# Patient Record
Sex: FEMALE | Race: AMERICAN INDIAN OR ALASKA NATIVE | NOT HISPANIC OR LATINO | URBAN - METROPOLITAN AREA
[De-identification: names, ages, dates, MRNs, and addresses within clinical notes are randomized per-mention and may not be internally consistent; named-entity substitution may affect disease eponyms.]

---

## 2023-10-15 ENCOUNTER — INPATIENT (INPATIENT)
Facility: HOSPITAL | Age: 28
LOS: 5 days | Discharge: HOME HEALTH | End: 2023-10-21
Attending: SPECIALIST | Admitting: SPECIALIST
Payer: MEDICAID

## 2023-10-15 VITALS
DIASTOLIC BLOOD PRESSURE: 70 MMHG | HEART RATE: 90 BPM | SYSTOLIC BLOOD PRESSURE: 119 MMHG | TEMPERATURE: 98 F | RESPIRATION RATE: 16 BRPM

## 2023-10-15 DIAGNOSIS — O26.899 OTHER SPECIFIED PREGNANCY RELATED CONDITIONS, UNSPECIFIED TRIMESTER: ICD-10-CM

## 2023-10-15 DIAGNOSIS — O42.90 PREMATURE RUPTURE OF MEMBRANES, UNSPECIFIED AS TO LENGTH OF TIME BETWEEN RUPTURE AND ONSET OF LABOR, UNSPECIFIED WEEKS OF GESTATION: ICD-10-CM

## 2023-10-15 LAB
AMNISURE ROM (RUPTURE OF MEMBRANES): POSITIVE
BASOPHILS # BLD AUTO: 0.07 K/UL — SIGNIFICANT CHANGE UP (ref 0–0.2)
BASOPHILS NFR BLD AUTO: 0.6 % — SIGNIFICANT CHANGE UP (ref 0–2)
EOSINOPHIL # BLD AUTO: 0.2 K/UL — SIGNIFICANT CHANGE UP (ref 0–0.5)
EOSINOPHIL NFR BLD AUTO: 1.7 % — SIGNIFICANT CHANGE UP (ref 0–6)
HCT VFR BLD CALC: 34.3 % — LOW (ref 34.5–45)
HGB BLD-MCNC: 11.2 G/DL — LOW (ref 11.5–15.5)
IANC: 7.87 K/UL — HIGH (ref 1.8–7.4)
IMM GRANULOCYTES NFR BLD AUTO: 1.2 % — HIGH (ref 0–0.9)
LYMPHOCYTES # BLD AUTO: 19.6 % — SIGNIFICANT CHANGE UP (ref 13–44)
LYMPHOCYTES # BLD AUTO: 2.29 K/UL — SIGNIFICANT CHANGE UP (ref 1–3.3)
MCHC RBC-ENTMCNC: 30.2 PG — SIGNIFICANT CHANGE UP (ref 27–34)
MCHC RBC-ENTMCNC: 32.7 GM/DL — SIGNIFICANT CHANGE UP (ref 32–36)
MCV RBC AUTO: 92.5 FL — SIGNIFICANT CHANGE UP (ref 80–100)
MONOCYTES # BLD AUTO: 1.12 K/UL — HIGH (ref 0–0.9)
MONOCYTES NFR BLD AUTO: 9.6 % — SIGNIFICANT CHANGE UP (ref 2–14)
NEUTROPHILS # BLD AUTO: 7.87 K/UL — HIGH (ref 1.8–7.4)
NEUTROPHILS NFR BLD AUTO: 67.3 % — SIGNIFICANT CHANGE UP (ref 43–77)
NRBC # BLD: 0 /100 WBCS — SIGNIFICANT CHANGE UP (ref 0–0)
NRBC # FLD: 0 K/UL — SIGNIFICANT CHANGE UP (ref 0–0)
PLATELET # BLD AUTO: 317 K/UL — SIGNIFICANT CHANGE UP (ref 150–400)
RBC # BLD: 3.71 M/UL — LOW (ref 3.8–5.2)
RBC # FLD: 13.5 % — SIGNIFICANT CHANGE UP (ref 10.3–14.5)
WBC # BLD: 11.69 K/UL — HIGH (ref 3.8–10.5)
WBC # FLD AUTO: 11.69 K/UL — HIGH (ref 3.8–10.5)

## 2023-10-15 RX ORDER — CHLORHEXIDINE GLUCONATE 213 G/1000ML
1 SOLUTION TOPICAL DAILY
Refills: 0 | Status: DISCONTINUED | OUTPATIENT
Start: 2023-10-15 | End: 2023-10-16

## 2023-10-15 RX ORDER — SODIUM CHLORIDE 9 MG/ML
1000 INJECTION, SOLUTION INTRAVENOUS
Refills: 0 | Status: DISCONTINUED | OUTPATIENT
Start: 2023-10-15 | End: 2023-10-16

## 2023-10-15 RX ORDER — SODIUM CHLORIDE 9 MG/ML
1000 INJECTION, SOLUTION INTRAVENOUS ONCE
Refills: 0 | Status: COMPLETED | OUTPATIENT
Start: 2023-10-15 | End: 2023-10-15

## 2023-10-15 RX ORDER — SODIUM CHLORIDE 9 MG/ML
1000 INJECTION, SOLUTION INTRAVENOUS ONCE
Refills: 0 | Status: DISCONTINUED | OUTPATIENT
Start: 2023-10-15 | End: 2023-10-16

## 2023-10-15 RX ORDER — OXYTOCIN 10 UNIT/ML
333.33 VIAL (ML) INJECTION
Qty: 20 | Refills: 0 | Status: DISCONTINUED | OUTPATIENT
Start: 2023-10-15 | End: 2023-10-16

## 2023-10-15 RX ADMIN — SODIUM CHLORIDE 1000 MILLILITER(S): 9 INJECTION, SOLUTION INTRAVENOUS at 21:26

## 2023-10-15 NOTE — OB PROVIDER H&P - TERM DELIVERIES, OB PROFILE
Discharge instructions given to patient. Follow up information provided. Prescriptions called to pharmacy per provider, verbalized understanding.      Brayden Wagner RN  05/31/23 [phone]

## 2023-10-15 NOTE — OB PROVIDER H&P - NS_OBGYNHISTORY_OBGYN_ALL_OB_FT
Prenatal care in [facility] - Fetal Alert BABY has a mild to moderate Tricuspid regurgitation

## 2023-10-15 NOTE — OB PROVIDER H&P - NSLOWPPHRISK_OBGYN_A_OB
No previous uterine incision/[place] Pregnancy/Less than or equal to 4 previous vaginal births/No known bleeding disorder/No history of postpartum hemorrhage/No other PPH risks indicated

## 2023-10-15 NOTE — OB PROVIDER TRIAGE NOTE - NS_OBGYNHISTORY_OBGYN_ALL_OB_FT
Prenatal care in [facility] - Fetal Alert BABY has a mild to moderate Tricuspid regurgitation

## 2023-10-15 NOTE — OB PROVIDER TRIAGE NOTE - NSOBPROVIDERNOTE_OBGYN_ALL_OB_FT
29 y/o , EDC 10/22, GA 39 weeks,  -fetal tachycardia, 1L LR bolus ordered 29 y/o , EDC 10/22, GA 39 weeks, admit for SROM  -fetal tachycardia, 1L LR bolus ordered  -amnisure positive  -Admit to Labor and Delivery  -Admission Consents obtained  -GBS negative per pt  -    -discussed with Dr. Carlos, PGY-4, d/w  27 y/o , EDC 10/22, GA 39 weeks, evidence of SROM  -fetal tachycardia, 1L LR bolus ordered  -amnisure positive  -Admit to Labor and Delivery for induction   -Admission Consents obtained  -GBS negative per pt  -Buccal Cytotec, cervical balloon    -discussed with Dr. Carlos, PGY-4, d/w Dr. Mauricio Dougherty, PAC

## 2023-10-15 NOTE — OB PROVIDER H&P - NSICDXPASTMEDICALHX_GEN_ALL_CORE_FT
What Type Of Note Output Would You Prefer (Optional)?: Bullet Format
How Severe Are Your Spot(S)?: mild
Have Your Spot(S) Been Treated In The Past?: has not been treated
Hpi Title: Evaluation of a Skin Lesion
PAST MEDICAL HISTORY:  No pertinent past medical history

## 2023-10-15 NOTE — OB PROVIDER H&P - ASSESSMENT
29 y/o , EDC 10/22, GA 39 weeks, evidence of SROM  -fetal tachycardia, 1L LR bolus ordered  -amnisure positive  -Admit to Labor and Delivery for induction   -Admission Consents obtained  -GBS negative per pt  -Buccal Cytotec, cervical balloon    -discussed with Dr. Carlos, PGY-4, d/w Dr. Mauricio Dougherty, PAC

## 2023-10-15 NOTE — CHART NOTE - NSCHARTNOTEFT_GEN_A_CORE
R4 Chart Note       Patient sees an OBGYN that is associated with [facility]. [facility] was called and however since the patient belongs to a private OBGYN , there are no records of her labs at [facility]. Patient states that she is sure that she is GBS negative. At this time we will treat patient as if she is GBS Negative and then confirm GBS status is the AM with her private OBGYN      d/w Dr. Martínez and Dr. Edwin Carlos, PGY4 R4 Chart Note       Patient sees an OBGYN that is associated with [facility]. [facility] was called and however since the patient belongs to a private OBGYN , there are no records of her labs at [facility]. Patient states that she is sure that she is GBS negative. At this time we will treat patient as if she is GBS Negative and then confirm GBS status is the AM with her private OBGYN      d/w Dr. Martínez and Dr. Edwin Carlos, PGY4     Addendum     Attempted contact with OBGYN however no answer at this time     Anu Carlos, PGY4

## 2023-10-15 NOTE — OB RN TRIAGE NOTE - NS_OBGYNHISTORY_OBGYN_ALL_OB_FT
Prenatal care in [facility] - Fetal Alert BABY has a mild to moderate Tricuspid regurgitation

## 2023-10-15 NOTE — OB PROVIDER TRIAGE NOTE - HISTORY OF PRESENT ILLNESS
27 y/o , EDC 10/22, GA 39 weeks, presents for leaking of clear fluid since 22:00 last night. Denies contractions/ VB. Reports good FM.    AP Course complicated by:  Fetal Alert: Baby has mild to moderate triscuspid regurgitation     PNC with [facility], associated with [place] [facility], was told to come deliver here due to fetal alert

## 2023-10-15 NOTE — OB PROVIDER H&P - NSHPPHYSICALEXAM_GEN_ALL_CORE
Vital Signs Last 24 Hrs  T(C): 36.9 (15 Oct 2023 20:37), Max: 36.9 (15 Oct 2023 20:29)  T(F): 98.42 (15 Oct 2023 20:37), Max: 98.42 (15 Oct 2023 20:37)  HR: 87 (15 Oct 2023 20:39) (87 - 90)  BP: 117/67 (15 Oct 2023 20:39) (117/67 - 119/70)  BP(mean): --  RR: 16 (15 Oct 2023 20:29) (16 - 16)  SpO2: --    Gen: A/O x3  Abd: Gravid uterus, toco in place   SSE: os appears closed, leukorrhea, no vaginal bleeding or pooling of fluid visualized in vault, nitrazine negative, ferning negative, amnisure sent  TAS: vertex, anterior placenta, THEODORE 10,  bpm, images saved in ASOB  FHR: 130 baseline, moderate variability, with accelerations, no decelerations, reactive  CTX: regular, q3-8 min   SVE: 0/0/-3   EFW: 3600

## 2023-10-15 NOTE — OB PROVIDER H&P - HISTORY OF PRESENT ILLNESS
29 y/o , EDC 10/22, GA 39 weeks, presents for leaking of clear fluid since 22:00 last night. Denies contractions/ VB. Reports good FM.    AP Course complicated by:  Fetal Alert: Baby has mild to moderate triscuspid regurgitation     PNC with [facility], associated with [place] [facility], was told to come deliver here due to fetal alert

## 2023-10-15 NOTE — OB RN TRIAGE NOTE - FALL HARM RISK - UNIVERSAL INTERVENTIONS
Bed in lowest position, wheels locked, appropriate side rails in place/Call bell, personal items and telephone in reach/Instruct patient to call for assistance before getting out of bed or chair/Non-slip footwear when patient is out of bed/[place] to call system/Physically safe environment - no spills, clutter or unnecessary equipment/Purposeful Proactive Rounding/Room/bathroom lighting operational, light cord in reach

## 2023-10-15 NOTE — OB PROVIDER TRIAGE NOTE - NSHPPHYSICALEXAM_GEN_ALL_CORE
Vital Signs Last 24 Hrs  T(C): 36.9 (15 Oct 2023 20:37), Max: 36.9 (15 Oct 2023 20:29)  T(F): 98.42 (15 Oct 2023 20:37), Max: 98.42 (15 Oct 2023 20:37)  HR: 87 (15 Oct 2023 20:39) (87 - 90)  BP: 117/67 (15 Oct 2023 20:39) (117/67 - 119/70)  BP(mean): --  RR: 16 (15 Oct 2023 20:29) (16 - 16)  SpO2: --    Gen: A/O x3  Abd: Gravid uterus, toco in place   SSE: os appears closed, leukorrhea, no vaginal bleeding or pooling of fluid visualized in vault, nitrazine negative, ferning negative, amnisure sent  TAS: vertex, images saved in ASOB  FHR: baseline, variability, accelerations, decelerations, reactive  CTX: regular, q3-8 min   SVE: 0/0/-3   EFW: 3600 Vital Signs Last 24 Hrs  T(C): 36.9 (15 Oct 2023 20:37), Max: 36.9 (15 Oct 2023 20:29)  T(F): 98.42 (15 Oct 2023 20:37), Max: 98.42 (15 Oct 2023 20:37)  HR: 87 (15 Oct 2023 20:39) (87 - 90)  BP: 117/67 (15 Oct 2023 20:39) (117/67 - 119/70)  BP(mean): --  RR: 16 (15 Oct 2023 20:29) (16 - 16)  SpO2: --    Gen: A/O x3  Abd: Gravid uterus, toco in place   SSE: os appears closed, leukorrhea, no vaginal bleeding or pooling of fluid visualized in vault, nitrazine negative, ferning negative, amnisure sent  TAS: vertex, anterior placenta, THEODORE 10,  bpm, images saved in ASOB  FHR: 130 baseline, moderate variability, with accelerations, no decelerations, reactive  CTX: regular, q3-8 min   SVE: 0/0/-3   EFW: 3600

## 2023-10-16 ENCOUNTER — RESULT REVIEW (OUTPATIENT)
Age: 28
End: 2023-10-16

## 2023-10-16 ENCOUNTER — TRANSCRIPTION ENCOUNTER (OUTPATIENT)
Age: 28
End: 2023-10-16

## 2023-10-16 LAB
BLD GP AB SCN SERPL QL: NEGATIVE — SIGNIFICANT CHANGE UP
HBV SURFACE AG SERPL QL IA: SIGNIFICANT CHANGE UP
HIV 1+2 AB+HIV1 P24 AG SERPL QL IA: SIGNIFICANT CHANGE UP
RH IG SCN BLD-IMP: POSITIVE — SIGNIFICANT CHANGE UP
RH IG SCN BLD-IMP: POSITIVE — SIGNIFICANT CHANGE UP

## 2023-10-16 RX ORDER — SODIUM CHLORIDE 9 MG/ML
1000 INJECTION, SOLUTION INTRAVENOUS ONCE
Refills: 0 | Status: COMPLETED | OUTPATIENT
Start: 2023-10-16 | End: 2023-10-16

## 2023-10-16 RX ORDER — SODIUM CHLORIDE 9 MG/ML
1000 INJECTION, SOLUTION INTRAVENOUS
Refills: 0 | Status: DISCONTINUED | OUTPATIENT
Start: 2023-10-16 | End: 2023-10-16

## 2023-10-16 RX ORDER — GENTAMICIN SULFATE 40 MG/ML
320 VIAL (ML) INJECTION ONCE
Refills: 0 | Status: COMPLETED | OUTPATIENT
Start: 2023-10-16 | End: 2023-10-16

## 2023-10-16 RX ORDER — AMPICILLIN TRIHYDRATE 250 MG
1 CAPSULE ORAL EVERY 4 HOURS
Refills: 0 | Status: DISCONTINUED | OUTPATIENT
Start: 2023-10-16 | End: 2023-10-16

## 2023-10-16 RX ORDER — ACETAMINOPHEN 500 MG
1000 TABLET ORAL ONCE
Refills: 0 | Status: COMPLETED | OUTPATIENT
Start: 2023-10-16 | End: 2023-10-16

## 2023-10-16 RX ORDER — AMPICILLIN TRIHYDRATE 250 MG
2 CAPSULE ORAL ONCE
Refills: 0 | Status: DISCONTINUED | OUTPATIENT
Start: 2023-10-16 | End: 2023-10-16

## 2023-10-16 RX ORDER — SODIUM CHLORIDE 9 MG/ML
500 INJECTION, SOLUTION INTRAVENOUS ONCE
Refills: 0 | Status: COMPLETED | OUTPATIENT
Start: 2023-10-16 | End: 2023-10-16

## 2023-10-16 RX ORDER — SODIUM CHLORIDE 9 MG/ML
1000 INJECTION, SOLUTION INTRAVENOUS ONCE
Refills: 0 | Status: DISCONTINUED | OUTPATIENT
Start: 2023-10-16 | End: 2023-10-16

## 2023-10-16 RX ORDER — AMPICILLIN TRIHYDRATE 250 MG
2 CAPSULE ORAL EVERY 6 HOURS
Refills: 0 | Status: DISCONTINUED | OUTPATIENT
Start: 2023-10-16 | End: 2023-10-17

## 2023-10-16 RX ORDER — OXYTOCIN 10 UNIT/ML
VIAL (ML) INJECTION
Qty: 30 | Refills: 0 | Status: DISCONTINUED | OUTPATIENT
Start: 2023-10-16 | End: 2023-10-17

## 2023-10-16 RX ORDER — OXYTOCIN 10 UNIT/ML
333.33 VIAL (ML) INJECTION
Qty: 20 | Refills: 0 | Status: COMPLETED | OUTPATIENT
Start: 2023-10-16 | End: 2023-10-16

## 2023-10-16 RX ORDER — INFLUENZA VIRUS VACCINE 15; 15; 15; 15 UG/.5ML; UG/.5ML; UG/.5ML; UG/.5ML
0.5 SUSPENSION INTRAMUSCULAR ONCE
Refills: 0 | Status: DISCONTINUED | OUTPATIENT
Start: 2023-10-16 | End: 2023-10-21

## 2023-10-16 RX ORDER — CHLORHEXIDINE GLUCONATE 213 G/1000ML
1 SOLUTION TOPICAL DAILY
Refills: 0 | Status: DISCONTINUED | OUTPATIENT
Start: 2023-10-16 | End: 2023-10-16

## 2023-10-16 RX ORDER — ACETAMINOPHEN 500 MG
975 TABLET ORAL ONCE
Refills: 0 | Status: COMPLETED | OUTPATIENT
Start: 2023-10-16 | End: 2023-10-16

## 2023-10-16 RX ADMIN — Medication 975 MILLIGRAM(S): at 16:55

## 2023-10-16 RX ADMIN — Medication 200 GRAM(S): at 18:53

## 2023-10-16 RX ADMIN — Medication 500 MILLIGRAM(S): at 19:42

## 2023-10-16 RX ADMIN — SODIUM CHLORIDE 2000 MILLILITER(S): 9 INJECTION, SOLUTION INTRAVENOUS at 16:55

## 2023-10-16 RX ADMIN — Medication 1000 MILLIGRAM(S): at 22:53

## 2023-10-16 RX ADMIN — Medication 975 MILLIGRAM(S): at 19:10

## 2023-10-16 RX ADMIN — Medication 2 MILLIUNIT(S)/MIN: at 20:55

## 2023-10-16 RX ADMIN — CHLORHEXIDINE GLUCONATE 1 APPLICATION(S): 213 SOLUTION TOPICAL at 12:35

## 2023-10-16 RX ADMIN — SODIUM CHLORIDE 1000 MILLILITER(S): 9 INJECTION, SOLUTION INTRAVENOUS at 19:15

## 2023-10-16 RX ADMIN — Medication 400 MILLIGRAM(S): at 22:25

## 2023-10-16 NOTE — OB PROVIDER LABOR PROGRESS NOTE - ASSESSMENT
29 y/o  @39+1w PROM IOL spPO/CB vat II tracing with moderate variability overall reassuring    transfer to LDR  For Pitocin if ctx pattern spaces out     TINO loyd

## 2023-10-16 NOTE — OB PROVIDER LABOR PROGRESS NOTE - ASSESSMENT
Patient with chorio on A/G. Cat II tracing    PLAN:  Will consider pitocin after reviewing ctx pattern with IUPC  Continuous FHT/[place]  Maternal/fetal status reassuring  Will continue to reassess prn.    Josue Mims PGY1

## 2023-10-16 NOTE — OB PROVIDER LABOR PROGRESS NOTE - ASSESSMENT
@39.1wks PROM IOL  Prolonged deceleration x7min with natalya to 60bpm  Pitocin discontinued  Cervical change noted with exam, reducible anterior lip   Patient repositioned to LL, fluid bolus initiated   Terbutaline 0.25mg ordered and given subq  MD Chin at bedside  ISE placed  Patient positioned on all fours  Return if FHR baseline noted with intrauterine resuscitation  Cont with resuscitative measures  Cont fetal/maternal monitoring  Will reevaluated in 30 minutes to start pushing      T. Salsone NP      @39.1wks PROM IOL  Prolonged deceleration x7min with natalya to 60bpm  Pitocin discontinued  Cervical change noted with exam, reducible anterior lip   Patient repositioned to LL, fluid bolus initiated   Terbutaline 0.25mg ordered and given subq  MD Ho at bedside  ISE placed  Patient positioned on all fours  Return if FHR baseline noted with intrauterine resuscitation  Cont with resuscitative measures  Cont fetal/maternal monitoring  Will reevaluated in 30 minutes to start pushing      T. Salsone NP      Ob  addendum   I was present for decel during tetanic contraction.  Pitocin dc'ed and terb given.  Cervical change now anterior lip + 1 station per NP exam.  Pt repositioned to hands and knees.  Pt febrile last dose of PO tylenol ~4:30pm will given 1gram IV tylenol now.   Pt already receiving antibiotics for suspected chorioamnionitis.    Will start pushing once Tylenol started.      Talia Ho MD    @39.1wks PROM IOL  Prolonged deceleration x7min with natalya to 60bpm  Pitocin discontinued  Cervical change noted with exam, reducible anterior lip   Patient repositioned to LL, fluid bolus initiated   Terbutaline 0.25mg ordered and given subq  MD Ho at bedside  ISE placed  Patient positioned on all fours  Return if FHR baseline noted with intrauterine resuscitation  Cont with resuscitative measures  Cont fetal/maternal monitoring  Will reevaluated in 30 minutes to start pushing      T. Salaje NP      Ob  addendum   I was present for decel during tetanic contraction.  Pitocin dc'ed and terb given.  Cervical change now anterior lip + 1 station per NP exam.  Pt repositioned to hands and knees.  Pt febrile last dose of PO tylenol ~4:30pm will given 1gram IV tylenol now.   Pt already receiving antibiotics for suspected chorioamnionitis.    Will start pushing once fully dilated.      Talia Ho MD

## 2023-10-16 NOTE — OB PROVIDER LABOR PROGRESS NOTE - ASSESSMENT
cat I tracing  offered CB, but pt declined  pt due for first dose of PO    PLAN:  Continuous FHT/[place]  Maternal/fetal status reassuring  Will continue to reassess prn.    Josue Mims PGY1

## 2023-10-16 NOTE — OB PROVIDER LABOR PROGRESS NOTE - ASSESSMENT
IOL for PROM (10p at 10/14), patient making cervical change however now meeting criteria for chorioamnionitis by fever + fetal tachycardia  - Amp/Gent/Tylenol  - Peanut ball, right lateral  - Anesthesia called for top off  - Continue resuscitative measures    D/w Dr. Vic Beatty PGY2  IOL for PROM (10p at 10/14), patient making cervical change however now meeting criteria for chorioamnionitis by fever + fetal tachycardia  - Amp/Gent/Tylenol  - Peanut ball, right lateral  - Anesthesia called for top off  - Continue resuscitative measures    D/w Dr. Vic Beatty PGY-2    Ob  addendum   Pt already received Tylenol <4 hrs ago, agree with rest of the plan.     Talia Ho MD

## 2023-10-16 NOTE — CHART NOTE - NSCHARTNOTEFT_GEN_A_CORE
Team Assessment    PTA due to suspected chorioamnionitis >3 hrs.    Attendees- Safety officers Vic and Catherine, charge RN- Michelle and Staff RN - Arely, PGY-4 Terrance.      Pt recently received IV Tylenol.  FHR coming down from 200bpm when febrile to now 175 moderate variability, +15x15 accels, +variable decel earlier.   VE  9.5cm/100/-1 at time of decel.   Attempted to push earlier however anterior lip unable to be reduced, ctxs irregular s/p terb x1 and pitocin being discontinued.  Pitocin restarted will continue to titrate per protocol.  Will start pushing once pt fully dilated.  Pt positioned to high fowlers with peanut ball.  Will continue to monitor FHT closely.    Talia Ho MD

## 2023-10-16 NOTE — OB PROVIDER LABOR PROGRESS NOTE - NS_OBIHIFHRDETAILS_OBGYN_ALL_OB_FT
FHT: 130 bpm, mod variability, +accels, -decels - category I tracing  [place]: Ctxs every 2-4 minutes.  SVE: 0/0/-3
FHT: 175 bpm, mod variability, +accels, - decels - category II tracing  [place]: Ctxs every 2-4 minutes.  SVE: 7/80/-2
175 mod matias/+accels/+prolonged deceleration x 7min
130/mod matias/+ accels/no decels
150/mod matias/+ accels/early/variable decels
175/mod/+accel/-decel

## 2023-10-16 NOTE — OB PROVIDER LABOR PROGRESS NOTE - ASSESSMENT
27 y/o  @39+1w PROM >18 hours on po cytotec    unchanged exam  pt refused gbs ppx with amp as she states she knows she is GBS negative  [facility] contacted; will send prenatal records  for epidural   DW Dr Sidney loyd, NP

## 2023-10-16 NOTE — OB PROVIDER LABOR PROGRESS NOTE - NS_SUBJECTIVE/OBJECTIVE_OBGYN_ALL_OB_FT
Patient seen and examined secondary to FHR deceleration. Effective epidural in place. Patient states she feels pressure with contractions.  VS  T(C): 37.6 (10-16-23 @ 20:43)  HR: 113 (10-16-23 @ 21:56)  BP: 133/73 (10-16-23 @ 21:56)  RR: 18 (10-16-23 @ 20:43)  SpO2: 99% (10-16-23 @ 21:56)
pt seen for cervical exam
10-16-23 @ 04:20  Patient was evaluated at bedside.  Her cervix was checked and found to be 0/0/-3.
10-16-23 @ 20:15  Patient was evaluated at bedside for IUPC placement.  Her cervix was checked and found to be 7/80/-2.
Pt seen for increased pain requesting epidural
Patient seen and evaluated at the bedside for cervical balloon placement. Patient comfortable with epidural in situ.     Vital Signs Last 24 Hrs  T(C): 37.0 (16 Oct 2023 11:15), Max: 37.0 (16 Oct 2023 11:15)  T(F): 98.6 (16 Oct 2023 11:15), Max: 98.6 (16 Oct 2023 11:15)  HR: 107 (16 Oct 2023 11:55) (85 - 107)  BP: 108/55 (16 Oct 2023 11:47) (101/57 - 130/60)  BP(mean): --  RR: 16 (16 Oct 2023 11:15) (16 - 18)  SpO2: 91% (16 Oct 2023 11:55) (88% - 100%)    Parameters below as of 16 Oct 2023 01:06  Patient On (Oxygen Delivery Method): room air
Patient seen for increased pain and pressure as well as persistent fetal tachycardia.

## 2023-10-16 NOTE — CHART NOTE - NSCHARTNOTEFT_GEN_A_CORE
Team Assessment Note      PTA held 2/2 chorioamnionitis and category II FHT.  Attendees- Safety officers Vic and Catherine Aguilar, Charge RN, FABIENNE Garcia, staff nurse for pt and PGY-4 Terrance.      Pt febrile during day shift temp 38.  Day residents gave Tylenol and repeated rectal temp which was 37.7.  1L bolus was given at that time.  When I came on shift fetal and maternal tachycardia noted.  Upon review, pt with PROM since 10/14 with temp and fetal tachycardia.  Reviewed with oncoming PGY-4 and decision made to treat for suspected chorioamnionitis.  Amp and gent recently started.  Pt also received another 500cc bolus.  Pt has made cervical change on her own now 7.5cm.  At time of PTA discussed placing an IUPC at this time.  If ctxs are inadequate will augment with pitocin.  PGY-1 Felicita to place IUPC.      Talia Ho MD  Team Assessment Note      PTA held 2/2 chorioamnionitis and category II FHT.  Attendees- Safety officers Vic and Catherine Aguilar, Charge RN, FABIENNE Garcia, staff nurse for pt and PGY-4 Terrance.       moderate variability, +15x15 accels - recent decels  [place] - ctxs q 1-3 mins     Pt febrile during day shift temp 38.  Day residents gave Tylenol and repeated rectal temp which was 37.7.  1L bolus was given at that time.  When I came on shift fetal and maternal tachycardia noted.  Upon review, pt with PROM since 10/14 with temp and fetal tachycardia.  Reviewed with oncoming PGY-4 and decision made to treat for suspected chorioamnionitis.  Amp and gent recently started.  Pt also received another 500cc bolus.  Pt has made cervical change on her own now 7cm.  At time of PTA discussed placing an IUPC at this time.  If ctxs are inadequate will augment with pitocin.  PGY-1 Felicita to place IUPC.      Talia Ho MD

## 2023-10-16 NOTE — CHART NOTE - NSCHARTNOTEFT_GEN_A_CORE
Service attending    t 135 moderate variability accels no decels  toco irreg  a/p cat 1 t  cytotec  for cb after epi  patient declining ampicillin as patient states GBBS neg -- attempting to retrieve hard copy of results    Sharri JIANG

## 2023-10-16 NOTE — OB PROVIDER LABOR PROGRESS NOTE - ASSESSMENT
29yo  @39w1d IOL 2/2 PROM@10p(10/14/23). Patient stable and progressing well.     Plan  - CB placed without difficulty  - Continue PO cytotec  - Prenatal records obtained, confirmed GBS neg, no indication for Abx ppx  - Peds Cardiology consulted for review of tricuspid regurgitation seen on outpatient fetal echo. Per Peds Cardio review of echo report, non-emergent  evaluation by Peds Cardio is warranted prior to discharge. Patient does not need to deliver need  Resuscitation Unit.     d/w Dr. Sidney De Los Santos PGY4

## 2023-10-16 NOTE — CHART NOTE - NSCHARTNOTEFT_GEN_A_CORE
R4 Chart Note     Patient provided records in regards to her babys cardiac condition. Fetal echo was performed on  and noted to have a mild to moderate tricuspid regurg. As per records , recommended that baby has a  cardiac evaluation prior to discharge.     NICU Fellow aware of patient        Anu Carlos, PGY4 R4 Chart Note     Patient provided records in regards to her babys cardiac condition. Fetal echo was performed on  and noted to have a mild to moderate tricuspid regurg. As per records , recommended that baby has a  cardiac evaluation prior to discharge.     NICU Fellow aware of patient        Anu Carlos, PGY4     Addendum     Patient fetal echo report sent to NICU team, advised to contact peds Cards in the AM       Anu Carlos, PGY4

## 2023-10-16 NOTE — OB RN PATIENT PROFILE - FALL HARM RISK - PATIENT NEEDS ASSISTANCE
Discontinue Regimen: Keflex 500mg
Detail Level: Zone
Initiate Treatment: Start prednisone, diprolene cream bid
No assistance needed

## 2023-10-17 LAB
RUBV IGG SER-ACNC: 13.9 INDEX — SIGNIFICANT CHANGE UP
RUBV IGG SER-ACNC: 13.9 INDEX — SIGNIFICANT CHANGE UP
RUBV IGG SER-IMP: POSITIVE — SIGNIFICANT CHANGE UP
RUBV IGG SER-IMP: POSITIVE — SIGNIFICANT CHANGE UP
T PALLIDUM AB TITR SER: NEGATIVE — SIGNIFICANT CHANGE UP
T PALLIDUM AB TITR SER: NEGATIVE — SIGNIFICANT CHANGE UP

## 2023-10-17 PROCEDURE — 59409 OBSTETRICAL CARE: CPT | Mod: U9,GC

## 2023-10-17 PROCEDURE — 88307 TISSUE EXAM BY PATHOLOGIST: CPT | Mod: 26

## 2023-10-17 DEVICE — SURGICEL NU-KNIT 6 X 9": Type: IMPLANTABLE DEVICE | Status: FUNCTIONAL

## 2023-10-17 RX ORDER — MAGNESIUM HYDROXIDE 400 MG/1
30 TABLET, CHEWABLE ORAL
Refills: 0 | Status: DISCONTINUED | OUTPATIENT
Start: 2023-10-17 | End: 2023-10-21

## 2023-10-17 RX ORDER — OXYTOCIN 10 UNIT/ML
333.33 VIAL (ML) INJECTION
Qty: 20 | Refills: 0 | Status: DISCONTINUED | OUTPATIENT
Start: 2023-10-17 | End: 2023-10-17

## 2023-10-17 RX ORDER — LANOLIN
1 OINTMENT (GRAM) TOPICAL EVERY 6 HOURS
Refills: 0 | Status: DISCONTINUED | OUTPATIENT
Start: 2023-10-17 | End: 2023-10-21

## 2023-10-17 RX ORDER — KETOROLAC TROMETHAMINE 30 MG/ML
30 SYRINGE (ML) INJECTION EVERY 6 HOURS
Refills: 0 | Status: DISCONTINUED | OUTPATIENT
Start: 2023-10-17 | End: 2023-10-18

## 2023-10-17 RX ORDER — FAMOTIDINE 10 MG/ML
20 INJECTION INTRAVENOUS ONCE
Refills: 0 | Status: COMPLETED | OUTPATIENT
Start: 2023-10-17 | End: 2023-10-17

## 2023-10-17 RX ORDER — DIPHENHYDRAMINE HCL 50 MG
25 CAPSULE ORAL EVERY 6 HOURS
Refills: 0 | Status: DISCONTINUED | OUTPATIENT
Start: 2023-10-17 | End: 2023-10-21

## 2023-10-17 RX ORDER — IBUPROFEN 200 MG
600 TABLET ORAL EVERY 6 HOURS
Refills: 0 | Status: COMPLETED | OUTPATIENT
Start: 2023-10-17 | End: 2024-09-14

## 2023-10-17 RX ORDER — SIMETHICONE 80 MG/1
80 TABLET, CHEWABLE ORAL EVERY 4 HOURS
Refills: 0 | Status: DISCONTINUED | OUTPATIENT
Start: 2023-10-17 | End: 2023-10-21

## 2023-10-17 RX ORDER — SODIUM CHLORIDE 9 MG/ML
1000 INJECTION, SOLUTION INTRAVENOUS
Refills: 0 | Status: DISCONTINUED | OUTPATIENT
Start: 2023-10-17 | End: 2023-10-20

## 2023-10-17 RX ORDER — OXYCODONE HYDROCHLORIDE 5 MG/1
5 TABLET ORAL
Refills: 0 | Status: COMPLETED | OUTPATIENT
Start: 2023-10-17 | End: 2023-10-24

## 2023-10-17 RX ORDER — ACETAMINOPHEN 500 MG
975 TABLET ORAL
Refills: 0 | Status: DISCONTINUED | OUTPATIENT
Start: 2023-10-17 | End: 2023-10-21

## 2023-10-17 RX ORDER — TETANUS TOXOID, REDUCED DIPHTHERIA TOXOID AND ACELLULAR PERTUSSIS VACCINE, ADSORBED 5; 2.5; 8; 8; 2.5 [IU]/.5ML; [IU]/.5ML; UG/.5ML; UG/.5ML; UG/.5ML
0.5 SUSPENSION INTRAMUSCULAR ONCE
Refills: 0 | Status: DISCONTINUED | OUTPATIENT
Start: 2023-10-17 | End: 2023-10-21

## 2023-10-17 RX ORDER — HEPARIN SODIUM 5000 [USP'U]/ML
5000 INJECTION INTRAVENOUS; SUBCUTANEOUS EVERY 12 HOURS
Refills: 0 | Status: DISCONTINUED | OUTPATIENT
Start: 2023-10-17 | End: 2023-10-21

## 2023-10-17 RX ORDER — CITRIC ACID/SODIUM CITRATE 300-500 MG
30 SOLUTION, ORAL ORAL ONCE
Refills: 0 | Status: COMPLETED | OUTPATIENT
Start: 2023-10-17 | End: 2023-10-17

## 2023-10-17 RX ORDER — OXYCODONE HYDROCHLORIDE 5 MG/1
5 TABLET ORAL ONCE
Refills: 0 | Status: DISCONTINUED | OUTPATIENT
Start: 2023-10-17 | End: 2023-10-21

## 2023-10-17 RX ADMIN — Medication 30 MILLIGRAM(S): at 09:03

## 2023-10-17 RX ADMIN — Medication 975 MILLIGRAM(S): at 21:23

## 2023-10-17 RX ADMIN — Medication 975 MILLIGRAM(S): at 22:23

## 2023-10-17 RX ADMIN — Medication 30 MILLIGRAM(S): at 18:00

## 2023-10-17 RX ADMIN — Medication 975 MILLIGRAM(S): at 13:49

## 2023-10-17 RX ADMIN — Medication 30 MILLIGRAM(S): at 10:00

## 2023-10-17 RX ADMIN — SODIUM CHLORIDE 125 MILLILITER(S): 9 INJECTION, SOLUTION INTRAVENOUS at 03:11

## 2023-10-17 RX ADMIN — Medication 30 MILLILITER(S): at 01:37

## 2023-10-17 RX ADMIN — Medication 975 MILLIGRAM(S): at 06:32

## 2023-10-17 RX ADMIN — HEPARIN SODIUM 5000 UNIT(S): 5000 INJECTION INTRAVENOUS; SUBCUTANEOUS at 09:03

## 2023-10-17 RX ADMIN — Medication 975 MILLIGRAM(S): at 13:00

## 2023-10-17 RX ADMIN — HEPARIN SODIUM 5000 UNIT(S): 5000 INJECTION INTRAVENOUS; SUBCUTANEOUS at 21:22

## 2023-10-17 RX ADMIN — FAMOTIDINE 20 MILLIGRAM(S): 10 INJECTION INTRAVENOUS at 01:37

## 2023-10-17 RX ADMIN — Medication 100 MILLIGRAM(S): at 09:02

## 2023-10-17 RX ADMIN — Medication 200 GRAM(S): at 01:02

## 2023-10-17 RX ADMIN — Medication 30 MILLIGRAM(S): at 16:55

## 2023-10-17 RX ADMIN — Medication 1000 MILLIUNIT(S)/MIN: at 03:12

## 2023-10-17 NOTE — OB RN DELIVERY SUMMARY - NS_SEPSISRSKCALC_OBGYN_ALL_OB_FT
EOS calculated successfully. EOS Risk Factor: 0.5/1000 live births ([facility] national incidence); GA=39w2d; Temp=101.12; ROM=52.15; GBS='Unknown'; Antibiotics='Broad spectrum antibiotics > 4 hrs prior to birth'

## 2023-10-17 NOTE — CHART NOTE - NSCHARTNOTEFT_GEN_A_CORE
R4 Chart Note    PTA  PTA was done with the team due to CAT II, Chorioamnionitis, Second Stage > 2 hours     Plan:  At this time patient noted to be 10/100/-1 pushing with great maternal effort however no descent of fetus  Patient offered c/s at this time however declining  Patient will like to continue pushing , in a different position for 30 more minutes , if no descent patient will be for primary c/s for cat 2   If fetus descends, consider instrumental delivery   Will reevaluate in 30 minutes     patient seen and examined with Dr. Vic Carlos, PGY4

## 2023-10-17 NOTE — OB RN DELIVERY SUMMARY - NS_LABORCHARACTER_OBGYN_ALL_OB
Induction of labor-Medicinal/Augmentation of labor/Febrile (>38C)/Internal electronic FM/External electronic FM/Antibiotics in labor/Chorioamnionitis

## 2023-10-17 NOTE — OB NEONATOLOGY/PEDIATRICIAN DELIVERY SUMMARY - NSPEDSNEONOTESA_OBGYN_ALL_OB_FT
Pediatrician called to delivery for fetal alert for mild to moderate tricuspid regurgitation, prolonged rupture of membranes, c/f chorio. Male/Female infant born at  39.1 wks via / to a  29 y/o  blood type O+ mother. Maternal history of none Prenatal history of mild/moderate tricuspid regurg. No significant maternal or prenatal history. Prenatal labs nr/immune/-, GBS - on . Prolonged ROM at 10/14 on 2200 with clear fluids. EOS score 0.9, highest maternal temperature 38.4. Baby emerged crying. Infant was brought to radiant warmer and warmed, dried, stimulated and suctioned. HR>100, initially good respiratory effort, but started grunting with sats of 60s-70s, required CPAP and admitted to NICU. APGARS of 6/6/6. Mom is initiating breast feeding and bottle feeding. Consents to Hepatitis B vaccination. Declines for infant to be circumcised. Pediatrician is Dr. Jiménez.     BW:  :  TOB: Pediatrician called to delivery for fetal alert for mild to moderate tricuspid regurgitation, prolonged rupture of membranes, c/f chorio. Male/Female infant born at  39.1 wks via  to a  29 y/o  blood type O+ mother. Maternal history of none Prenatal history of mild/moderate tricuspid regurg. No significant maternal or prenatal history. Prenatal labs nr/immune/-, GBS - on . Prolonged ROM at 10/14 on 2200 with clear fluids. EOS score 0.9, highest maternal temperature 38.4. Baby emerged crying. Infant was brought to radiant warmer and warmed, dried, stimulated and suctioned. HR>100, initially good respiratory effort, but started grunting with sats of 60s-70s, required CPAP and admitted to NICU. APGARS of 6/6/6. Mom is initiating breast feeding and bottle feeding. Consents to Hepatitis B vaccination. Declines for infant to be circumcised. Pediatrician is Dr. Jiménez.     BW: 4.09  : 10/17  TOB: 0209 Pediatrician called to delivery for fetal alert for mild to moderate tricuspid regurgitation, prolonged rupture of membranes, c/f chorio. Male infant born at  39.1 wks via  to a  31 y/o  blood type O+ mother. Maternal history of none Prenatal history of mild/moderate tricuspid regurg. No significant maternal or prenatal history. Prenatal labs nr/immune/-, GBS - on . Prolonged ROM at 10/14 on 2200 with clear fluids. EOS score 0.9, highest maternal temperature 38.4. Baby emerged crying. Infant was brought to radiant warmer and warmed, dried, stimulated and suctioned. HR>100, initially good respiratory effort, but started grunting with sats of 60s-70s, required CPAP and admitted to NICU. APGARS of 6/6/6. Mom is initiating breast feeding and bottle feeding. Consents to Hepatitis B vaccination. Declines for infant to be circumcised. Pediatrician is Dr. Jiménez.     BW: 4.09  : 10/17  TOB: 0209 Pediatrician called to delivery for fetal alert for mild to moderate tricuspid regurgitation, prolonged rupture of membranes, c/f chorio. Male infant born at  39.1 wks via  to a  29 y/o  blood type O+ mother. Maternal history of none Prenatal history of mild/moderate tricuspid regurg. No significant maternal or prenatal history. Prenatal labs nr/immune/-, GBS - on . Prolonged ROM at 10/14 on 2200 with clear fluids. EOS score 0.9, highest maternal temperature 38.4. Baby emerged crying. Infant was brought to radiant warmer and warmed, dried, stimulated and suctioned. HR>100, initially good respiratory effort, but started grunting with sats of 60s-70s, required CPAP and admitted to NICU. APGARS of 7/9. Mom is initiating breast feeding and bottle feeding. Consents to Hepatitis B vaccination. Declines for infant to be circumcised. Pediatrician is Dr. Jiménez.     BW: 4.09  : 10/17  TOB: 0209

## 2023-10-17 NOTE — OB PROVIDER DELIVERY SUMMARY - NSPROVIDERDELIVERYNOTE_OBGYN_ALL_OB_FT
primary LTCS for Cat 2 tracing   viable male infant, vertex presentation, cord gasses sent  Grossly normal fallopian tubes, uterus, and ovaries     Uterus closed with vicryl   Surgicel powder over the hysterotomy   Additional hemostatic sutures of vicryl placed along the hysterotomy    EBL: 466  IVF: 1000  UOP:250    Terrance PGY4  w/ Dr. Ho primary LTCS for Cat 2 tracing   viable male infant, vertex presentation, cord gasses sent  Grossly normal fallopian tubes, uterus, and ovaries     Uterus closed with vicryl   Surgicel powder over the hysterotomy   Additional hemostatic sutures of vicryl placed along the hysterotomy    EBL: 466  IVF: 1000  UOP:250     Dictation#[id number]    Terrance PGY4  w/ Dr. Ho primary LTCS for Arrest of Descent, category II FHT  viable male infant, vertex presentation, cord gasses sent  Grossly normal fallopian tubes, uterus, and ovaries     Uterus closed with vicryl   Surgicel powder over the hysterotomy   Additional hemostatic sutures of vicryl placed along the hysterotomy    EBL: 466  IVF: 1000  UOP:250     Dictation#[id number]    Terrance PGY4  w/ Dr. Ho

## 2023-10-17 NOTE — OB PROVIDER DELIVERY SUMMARY - NSSELHIDDEN_OBGYN_ALL_OB_FT
[NS_[email or web address]_ALL_OB_FT:[id number]=],[NS_DeliveryAssist1_OBGYN_ALL_OB_FT:[id number]=]

## 2023-10-17 NOTE — OB NEONATOLOGY/PEDIATRICIAN DELIVERY SUMMARY - NSCSDELIVASCHE_OBGYN_ALL_OB
INCOMPLETE 64 years old female with,    1) Chest Pain  - Trops elevated  - ECHO with severe global left ventricle hypokinesis with akinesis of the apex. EF 25-30%.  - Continue FD Lovenox  - Start Aspirin, Lipitor and Metoprolol  - Cardiology following, pending further recommendations    2) Oral Mass  - MRI pending  - ENT consult appreciated    3) LE DVT  - Eliquis on hold  - Continue FD Lovenox    4) History of Breast Cancer   - s/p surgery and radiation  - Was on Tamoxifen   - Oncology following    5) Gastroparesis  - Continue Reglan    6) Protein Calorie Malnutrition  - Continue Vitamin C and Multivitamin   - Ensure added  - Nutrition Consult    DVT Prophylaxis -- Patient is on FD Lovenox    Dispo: Home in 2-3 days. 64 years old female with,    1) NSTEMI  - Trops elevated  - ECHO with severe global left ventricle hypokinesis with akinesis of the apex. EF 25-30%.  - Continue FD Lovenox  - Start Aspirin, Lipitor and Metoprolol  - Cardiology following, pending further recommendations    2) Acute Systolic Heart Failure  - Continue Lasix 40 mg daily  - Start Metoprolol as above  - Will add ACEI once BP improves     3) Oral Mass  - MRI pending  - ENT consult appreciated    4) LE DVT  - Eliquis on hold  - Continue FD Lovenox    5) History of Breast Cancer   - s/p surgery and radiation  - Was on Tamoxifen   - Oncology following    6) Gastroparesis  - Continue Reglan    7) Protein Calorie Malnutrition  - Continue Vitamin C and Multivitamin   - Ensure added  - Nutrition Consult    DVT Prophylaxis -- Patient is on FD Lovenox    Dispo: Home in 2-3 days. Unscheduled

## 2023-10-17 NOTE — OB RN DELIVERY SUMMARY - NSSELHIDDEN_OBGYN_ALL_OB_FT
[NS_DeliveryAttending1_OBGYN_ALL_OB_FT:[id number]=],[NS_DeliveryAssist1_OBGYN_ALL_OB_FT:[id number]=],[NS_DeliveryRN_OBGYN_ALL_OB_FT:[id number]=]

## 2023-10-17 NOTE — CHART NOTE - NSCHARTNOTEFT_GEN_A_CORE
Ob  Note    Pt was fully dilated and -1 station.  Discussed PLTCS at that time vs pushing for another 40 mins to see if there was any descent of fetal head.  Pt initially chose to continue pushing.  Called to room by RN pt states she no longer wants to push any more and desires PLTCS at this time.  Pitocin discontinued.  Risks and benefits of PLTCS d/w pt.  Informed consent obtained and signed.  OR being opened, anesthesia and L+D staff made aware.     Talia Ho MD Ob  Note    Pt fully dilated and -1 station.  Discussed PLTCS at that time for arrest of descent vs pushing for another 40 mins to see if there is any descent of fetal head.  Pt initially chose to continue pushing.  Called to room by RN pt states she no longer wants to push any more and desires PLTCS at this time.  Pitocin discontinued.  Risks and benefits of PLTCS d/w pt.  Informed consent obtained and signed.  OR being opened, anesthesia and L+D staff made aware.     Talia Ho MD

## 2023-10-18 DIAGNOSIS — N71.9 INFLAMMATORY DISEASE OF UTERUS, UNSPECIFIED: ICD-10-CM

## 2023-10-18 LAB
BASOPHILS # BLD AUTO: 0.06 K/UL — SIGNIFICANT CHANGE UP (ref 0–0.2)
BASOPHILS # BLD AUTO: 0.06 K/UL — SIGNIFICANT CHANGE UP (ref 0–0.2)
BASOPHILS NFR BLD AUTO: 0.4 % — SIGNIFICANT CHANGE UP (ref 0–2)
BASOPHILS NFR BLD AUTO: 0.4 % — SIGNIFICANT CHANGE UP (ref 0–2)
EOSINOPHIL # BLD AUTO: 0.24 K/UL — SIGNIFICANT CHANGE UP (ref 0–0.5)
EOSINOPHIL # BLD AUTO: 0.24 K/UL — SIGNIFICANT CHANGE UP (ref 0–0.5)
EOSINOPHIL NFR BLD AUTO: 1.6 % — SIGNIFICANT CHANGE UP (ref 0–6)
EOSINOPHIL NFR BLD AUTO: 1.6 % — SIGNIFICANT CHANGE UP (ref 0–6)
HCT VFR BLD CALC: 24.4 % — LOW (ref 34.5–45)
HCT VFR BLD CALC: 24.4 % — LOW (ref 34.5–45)
HGB BLD-MCNC: 8 G/DL — LOW (ref 11.5–15.5)
HGB BLD-MCNC: 8 G/DL — LOW (ref 11.5–15.5)
IANC: 10.94 K/UL — HIGH (ref 1.8–7.4)
IANC: 10.94 K/UL — HIGH (ref 1.8–7.4)
IMM GRANULOCYTES NFR BLD AUTO: 0.8 % — SIGNIFICANT CHANGE UP (ref 0–0.9)
IMM GRANULOCYTES NFR BLD AUTO: 0.8 % — SIGNIFICANT CHANGE UP (ref 0–0.9)
LYMPHOCYTES # BLD AUTO: 1.72 K/UL — SIGNIFICANT CHANGE UP (ref 1–3.3)
LYMPHOCYTES # BLD AUTO: 1.72 K/UL — SIGNIFICANT CHANGE UP (ref 1–3.3)
LYMPHOCYTES # BLD AUTO: 11.8 % — LOW (ref 13–44)
LYMPHOCYTES # BLD AUTO: 11.8 % — LOW (ref 13–44)
MCHC RBC-ENTMCNC: 30.3 PG — SIGNIFICANT CHANGE UP (ref 27–34)
MCHC RBC-ENTMCNC: 30.3 PG — SIGNIFICANT CHANGE UP (ref 27–34)
MCHC RBC-ENTMCNC: 32.8 GM/DL — SIGNIFICANT CHANGE UP (ref 32–36)
MCHC RBC-ENTMCNC: 32.8 GM/DL — SIGNIFICANT CHANGE UP (ref 32–36)
MCV RBC AUTO: 92.4 FL — SIGNIFICANT CHANGE UP (ref 80–100)
MCV RBC AUTO: 92.4 FL — SIGNIFICANT CHANGE UP (ref 80–100)
MONOCYTES # BLD AUTO: 1.51 K/UL — HIGH (ref 0–0.9)
MONOCYTES # BLD AUTO: 1.51 K/UL — HIGH (ref 0–0.9)
MONOCYTES NFR BLD AUTO: 10.4 % — SIGNIFICANT CHANGE UP (ref 2–14)
MONOCYTES NFR BLD AUTO: 10.4 % — SIGNIFICANT CHANGE UP (ref 2–14)
NEUTROPHILS # BLD AUTO: 10.94 K/UL — HIGH (ref 1.8–7.4)
NEUTROPHILS # BLD AUTO: 10.94 K/UL — HIGH (ref 1.8–7.4)
NEUTROPHILS NFR BLD AUTO: 75 % — SIGNIFICANT CHANGE UP (ref 43–77)
NEUTROPHILS NFR BLD AUTO: 75 % — SIGNIFICANT CHANGE UP (ref 43–77)
NRBC # BLD: 0 /100 WBCS — SIGNIFICANT CHANGE UP (ref 0–0)
NRBC # BLD: 0 /100 WBCS — SIGNIFICANT CHANGE UP (ref 0–0)
NRBC # FLD: 0 K/UL — SIGNIFICANT CHANGE UP (ref 0–0)
NRBC # FLD: 0 K/UL — SIGNIFICANT CHANGE UP (ref 0–0)
PLATELET # BLD AUTO: 257 K/UL — SIGNIFICANT CHANGE UP (ref 150–400)
PLATELET # BLD AUTO: 257 K/UL — SIGNIFICANT CHANGE UP (ref 150–400)
RBC # BLD: 2.64 M/UL — LOW (ref 3.8–5.2)
RBC # BLD: 2.64 M/UL — LOW (ref 3.8–5.2)
RBC # FLD: 13.9 % — SIGNIFICANT CHANGE UP (ref 10.3–14.5)
RBC # FLD: 13.9 % — SIGNIFICANT CHANGE UP (ref 10.3–14.5)
WBC # BLD: 14.58 K/UL — HIGH (ref 3.8–10.5)
WBC # BLD: 14.58 K/UL — HIGH (ref 3.8–10.5)
WBC # FLD AUTO: 14.58 K/UL — HIGH (ref 3.8–10.5)
WBC # FLD AUTO: 14.58 K/UL — HIGH (ref 3.8–10.5)

## 2023-10-18 RX ORDER — SENNA PLUS 8.6 MG/1
2 TABLET ORAL AT BEDTIME
Refills: 0 | Status: DISCONTINUED | OUTPATIENT
Start: 2023-10-18 | End: 2023-10-21

## 2023-10-18 RX ORDER — OXYCODONE HYDROCHLORIDE 5 MG/1
5 TABLET ORAL
Refills: 0 | Status: DISCONTINUED | OUTPATIENT
Start: 2023-10-18 | End: 2023-10-21

## 2023-10-18 RX ORDER — ERTAPENEM SODIUM 1 G/1
1000 INJECTION, POWDER, LYOPHILIZED, FOR SOLUTION INTRAMUSCULAR; INTRAVENOUS ONCE
Refills: 0 | Status: COMPLETED | OUTPATIENT
Start: 2023-10-18 | End: 2023-10-18

## 2023-10-18 RX ORDER — IBUPROFEN 200 MG
600 TABLET ORAL EVERY 6 HOURS
Refills: 0 | Status: DISCONTINUED | OUTPATIENT
Start: 2023-10-18 | End: 2023-10-21

## 2023-10-18 RX ORDER — OXYCODONE HYDROCHLORIDE 5 MG/1
5 TABLET ORAL ONCE
Refills: 0 | Status: DISCONTINUED | OUTPATIENT
Start: 2023-10-18 | End: 2023-10-21

## 2023-10-18 RX ADMIN — Medication 600 MILLIGRAM(S): at 05:28

## 2023-10-18 RX ADMIN — MAGNESIUM HYDROXIDE 30 MILLILITER(S): 400 TABLET, CHEWABLE ORAL at 23:16

## 2023-10-18 RX ADMIN — Medication 600 MILLIGRAM(S): at 06:28

## 2023-10-18 RX ADMIN — SIMETHICONE 80 MILLIGRAM(S): 80 TABLET, CHEWABLE ORAL at 23:16

## 2023-10-18 RX ADMIN — HEPARIN SODIUM 5000 UNIT(S): 5000 INJECTION INTRAVENOUS; SUBCUTANEOUS at 12:09

## 2023-10-18 RX ADMIN — SENNA PLUS 2 TABLET(S): 8.6 TABLET ORAL at 23:16

## 2023-10-18 RX ADMIN — OXYCODONE HYDROCHLORIDE 5 MILLIGRAM(S): 5 TABLET ORAL at 06:51

## 2023-10-18 RX ADMIN — Medication 600 MILLIGRAM(S): at 18:53

## 2023-10-18 RX ADMIN — ERTAPENEM SODIUM 120 MILLIGRAM(S): 1 INJECTION, POWDER, LYOPHILIZED, FOR SOLUTION INTRAMUSCULAR; INTRAVENOUS at 10:00

## 2023-10-18 RX ADMIN — Medication 600 MILLIGRAM(S): at 00:20

## 2023-10-18 RX ADMIN — SIMETHICONE 80 MILLIGRAM(S): 80 TABLET, CHEWABLE ORAL at 12:08

## 2023-10-18 RX ADMIN — MAGNESIUM HYDROXIDE 30 MILLILITER(S): 400 TABLET, CHEWABLE ORAL at 12:07

## 2023-10-18 RX ADMIN — Medication 600 MILLIGRAM(S): at 18:14

## 2023-10-18 RX ADMIN — Medication 975 MILLIGRAM(S): at 20:28

## 2023-10-18 RX ADMIN — Medication 600 MILLIGRAM(S): at 12:09

## 2023-10-18 RX ADMIN — OXYCODONE HYDROCHLORIDE 5 MILLIGRAM(S): 5 TABLET ORAL at 06:01

## 2023-10-18 RX ADMIN — Medication 600 MILLIGRAM(S): at 12:45

## 2023-10-18 RX ADMIN — Medication 975 MILLIGRAM(S): at 21:05

## 2023-10-18 RX ADMIN — Medication 600 MILLIGRAM(S): at 01:20

## 2023-10-18 NOTE — PROGRESS NOTE ADULT - SUBJECTIVE AND OBJECTIVE BOX
OB Progress Note:  Delivery, POD#1    S: 27yo POD#1 s/p pLTCS for arrest of descent c/b chorio (s/p ampicillin gentamycin and clindamycin). This morning she continued to have significant abdominal pain (8/10). She is tolerating a regular diet and passing flatus, but noted concern for gas pain. Denies N/V. Denies CP/SOB/lightheadedness/dizziness.   She is ambulating without difficulty.   Voiding spontaneously.     O:   Vital Signs Last 24 Hrs  T(C): 36.6 (18 Oct 2023 06:02), Max: 36.9 (18 Oct 2023 01:45)  T(F): 97.9 (18 Oct 2023 06:02), Max: 98.4 (18 Oct 2023 01:45)  HR: 89 (18 Oct 2023 06:02) (74 - 92)  BP: 101/57 (18 Oct 2023 06:02) (92/59 - 127/72)  BP(mean): --  RR: 20 (18 Oct 2023 06:02) (18 - 20)  SpO2: 100% (18 Oct 2023 06:02) (99% - 100%)    Parameters below as of 18 Oct 2023 06:02  Patient On (Oxygen Delivery Method): room air        Labs:  Blood type: O Positive  Rubella IgG: RPR: Negative                          8.0<L>   14.58<H> >-----------< 257    ( 10-18 @ 06:05 )             24.4<L>                        11.2<L>   11.69<H> >-----------< 317    ( 10-15 @ 23:20 )             34.3<L>                  PE:  General: NAD  Abdomen: Mildly distended, significant tenderness to palpation, incision c/d/i.  : lochia WNL  Extremities: No erythema, no pitting edema

## 2023-10-18 NOTE — PROGRESS NOTE ADULT - SUBJECTIVE AND OBJECTIVE BOX
Day 1 of Anesthesia Pain Management Service    SUBJECTIVE:  Pain Scale Score: 0    THERAPY: Received epidural morphine    OBJECTIVE:    Sedations: [x ] Alert      [ ] Drowsy    [ ] Arousable    [ ] Asleep   [ ] Unresponsive    Side Effects:   [x ] None   [ ] Nausea   [ ] Vomiting   [ ] Pruritus   [ ] Weakness   [ ] Numbness   [ ] Other:     ASSESSMENT/PLAN:  [x] Patient transitioned to prn analgesics  [x] Pain management per primary service, pain service to sign off  [x] Documention and verification of current medications

## 2023-10-19 DIAGNOSIS — D64.9 ANEMIA, UNSPECIFIED: ICD-10-CM

## 2023-10-19 LAB
BLD GP AB SCN SERPL QL: NEGATIVE — SIGNIFICANT CHANGE UP
BLD GP AB SCN SERPL QL: NEGATIVE — SIGNIFICANT CHANGE UP
HCT VFR BLD CALC: 21.2 % — LOW (ref 34.5–45)
HCT VFR BLD CALC: 21.2 % — LOW (ref 34.5–45)
HCT VFR BLD CALC: 24.9 % — LOW (ref 34.5–45)
HCT VFR BLD CALC: 24.9 % — LOW (ref 34.5–45)
HGB BLD-MCNC: 6.9 G/DL — CRITICAL LOW (ref 11.5–15.5)
HGB BLD-MCNC: 6.9 G/DL — CRITICAL LOW (ref 11.5–15.5)
HGB BLD-MCNC: 8.3 G/DL — LOW (ref 11.5–15.5)
HGB BLD-MCNC: 8.3 G/DL — LOW (ref 11.5–15.5)
MCHC RBC-ENTMCNC: 29.9 PG — SIGNIFICANT CHANGE UP (ref 27–34)
MCHC RBC-ENTMCNC: 32.5 GM/DL — SIGNIFICANT CHANGE UP (ref 32–36)
MCHC RBC-ENTMCNC: 32.5 GM/DL — SIGNIFICANT CHANGE UP (ref 32–36)
MCHC RBC-ENTMCNC: 33.3 GM/DL — SIGNIFICANT CHANGE UP (ref 32–36)
MCHC RBC-ENTMCNC: 33.3 GM/DL — SIGNIFICANT CHANGE UP (ref 32–36)
MCV RBC AUTO: 89.6 FL — SIGNIFICANT CHANGE UP (ref 80–100)
MCV RBC AUTO: 89.6 FL — SIGNIFICANT CHANGE UP (ref 80–100)
MCV RBC AUTO: 91.8 FL — SIGNIFICANT CHANGE UP (ref 80–100)
MCV RBC AUTO: 91.8 FL — SIGNIFICANT CHANGE UP (ref 80–100)
NRBC # BLD: 0 /100 WBCS — SIGNIFICANT CHANGE UP (ref 0–0)
NRBC # FLD: 0 K/UL — SIGNIFICANT CHANGE UP (ref 0–0)
PLATELET # BLD AUTO: 257 K/UL — SIGNIFICANT CHANGE UP (ref 150–400)
PLATELET # BLD AUTO: 257 K/UL — SIGNIFICANT CHANGE UP (ref 150–400)
PLATELET # BLD AUTO: 283 K/UL — SIGNIFICANT CHANGE UP (ref 150–400)
PLATELET # BLD AUTO: 283 K/UL — SIGNIFICANT CHANGE UP (ref 150–400)
RBC # BLD: 2.31 M/UL — LOW (ref 3.8–5.2)
RBC # BLD: 2.31 M/UL — LOW (ref 3.8–5.2)
RBC # BLD: 2.78 M/UL — LOW (ref 3.8–5.2)
RBC # BLD: 2.78 M/UL — LOW (ref 3.8–5.2)
RBC # FLD: 14.1 % — SIGNIFICANT CHANGE UP (ref 10.3–14.5)
RBC # FLD: 14.1 % — SIGNIFICANT CHANGE UP (ref 10.3–14.5)
RBC # FLD: 15.6 % — HIGH (ref 10.3–14.5)
RBC # FLD: 15.6 % — HIGH (ref 10.3–14.5)
RH IG SCN BLD-IMP: POSITIVE — SIGNIFICANT CHANGE UP
RH IG SCN BLD-IMP: POSITIVE — SIGNIFICANT CHANGE UP
WBC # BLD: 7.79 K/UL — SIGNIFICANT CHANGE UP (ref 3.8–10.5)
WBC # BLD: 7.79 K/UL — SIGNIFICANT CHANGE UP (ref 3.8–10.5)
WBC # BLD: 9.48 K/UL — SIGNIFICANT CHANGE UP (ref 3.8–10.5)
WBC # BLD: 9.48 K/UL — SIGNIFICANT CHANGE UP (ref 3.8–10.5)
WBC # FLD AUTO: 7.79 K/UL — SIGNIFICANT CHANGE UP (ref 3.8–10.5)
WBC # FLD AUTO: 7.79 K/UL — SIGNIFICANT CHANGE UP (ref 3.8–10.5)
WBC # FLD AUTO: 9.48 K/UL — SIGNIFICANT CHANGE UP (ref 3.8–10.5)
WBC # FLD AUTO: 9.48 K/UL — SIGNIFICANT CHANGE UP (ref 3.8–10.5)

## 2023-10-19 RX ORDER — DIPHENHYDRAMINE HCL 50 MG
25 CAPSULE ORAL ONCE
Refills: 0 | Status: COMPLETED | OUTPATIENT
Start: 2023-10-19 | End: 2023-10-19

## 2023-10-19 RX ORDER — ERTAPENEM SODIUM 1 G/1
1000 INJECTION, POWDER, LYOPHILIZED, FOR SOLUTION INTRAMUSCULAR; INTRAVENOUS ONCE
Refills: 0 | Status: DISCONTINUED | OUTPATIENT
Start: 2023-10-19 | End: 2023-10-19

## 2023-10-19 RX ORDER — ERTAPENEM SODIUM 1 G/1
1000 INJECTION, POWDER, LYOPHILIZED, FOR SOLUTION INTRAMUSCULAR; INTRAVENOUS EVERY 24 HOURS
Refills: 0 | Status: COMPLETED | OUTPATIENT
Start: 2023-10-19 | End: 2023-10-20

## 2023-10-19 RX ORDER — FERROUS SULFATE 325(65) MG
325 TABLET ORAL DAILY
Refills: 0 | Status: DISCONTINUED | OUTPATIENT
Start: 2023-10-19 | End: 2023-10-21

## 2023-10-19 RX ORDER — POLYETHYLENE GLYCOL 3350 17 G/17G
17 POWDER, FOR SOLUTION ORAL ONCE
Refills: 0 | Status: COMPLETED | OUTPATIENT
Start: 2023-10-19 | End: 2023-10-19

## 2023-10-19 RX ADMIN — Medication 25 MILLIGRAM(S): at 12:09

## 2023-10-19 RX ADMIN — Medication 975 MILLIGRAM(S): at 09:17

## 2023-10-19 RX ADMIN — Medication 325 MILLIGRAM(S): at 22:53

## 2023-10-19 RX ADMIN — Medication 975 MILLIGRAM(S): at 16:30

## 2023-10-19 RX ADMIN — HEPARIN SODIUM 5000 UNIT(S): 5000 INJECTION INTRAVENOUS; SUBCUTANEOUS at 00:16

## 2023-10-19 RX ADMIN — Medication 975 MILLIGRAM(S): at 02:41

## 2023-10-19 RX ADMIN — Medication 600 MILLIGRAM(S): at 00:16

## 2023-10-19 RX ADMIN — Medication 600 MILLIGRAM(S): at 12:09

## 2023-10-19 RX ADMIN — Medication 600 MILLIGRAM(S): at 13:19

## 2023-10-19 RX ADMIN — Medication 600 MILLIGRAM(S): at 00:50

## 2023-10-19 RX ADMIN — Medication 975 MILLIGRAM(S): at 15:37

## 2023-10-19 RX ADMIN — Medication 975 MILLIGRAM(S): at 22:45

## 2023-10-19 RX ADMIN — Medication 975 MILLIGRAM(S): at 10:28

## 2023-10-19 RX ADMIN — Medication 975 MILLIGRAM(S): at 22:07

## 2023-10-19 RX ADMIN — POLYETHYLENE GLYCOL 3350 17 GRAM(S): 17 POWDER, FOR SOLUTION ORAL at 22:53

## 2023-10-19 RX ADMIN — Medication 600 MILLIGRAM(S): at 05:53

## 2023-10-19 RX ADMIN — ERTAPENEM SODIUM 120 MILLIGRAM(S): 1 INJECTION, POWDER, LYOPHILIZED, FOR SOLUTION INTRAMUSCULAR; INTRAVENOUS at 10:57

## 2023-10-19 RX ADMIN — HEPARIN SODIUM 5000 UNIT(S): 5000 INJECTION INTRAVENOUS; SUBCUTANEOUS at 12:09

## 2023-10-19 NOTE — PROVIDER CONTACT NOTE (OTHER) - BACKGROUND
P. c/s patient 10/17/23 @ 0209am, qbl: 466 Patient is s/p amp/gent/clindamycin, temp on 10/16/23 @22:22, of 38.4,  (Invanz x 1 dose)

## 2023-10-19 NOTE — PROVIDER CONTACT NOTE (OTHER) - ASSESSMENT
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 Trey Sylvester abnormal cording. ACHIEVED   4. Pt will be independent in self-manual lymph drainage. 5. Improve mobility of abnormal cording to improve Left shoulder pain-free AROM by 10-35 degrees to allow pt to reach into overhead cabinets.     8/5 GOAL ADDITION: to Patient has been stable throughout shift with baseline low BP's which was previously mentioned to MD. Josue Mims. Patient denies shortness of breath, headache or chest pain. Patient ambulates w/o difficulties and was in good spirit.

## 2023-10-19 NOTE — PROGRESS NOTE ADULT - SUBJECTIVE AND OBJECTIVE BOX
OB Progress Note: LTCS, POD#2    S: 27yo POD#2 s/p s/p pLTCS for arrest of descent c/b chorio (s/p ampicillin gentamycin and clindamycin) now w/ endometritis due to clinical presentation on Invanz.  Pain is improved this morning. She is tolerating a regular diet and passing flatus. She is voiding spontaneously, and ambulating without difficulty. Denies CP/SOB. Denies lightheadedness/dizziness. Denies N/V.    O:  Vitals:  Vital Signs Last 24 Hrs  T(C): 36.8 (19 Oct 2023 06:17), Max: 37.1 (18 Oct 2023 17:35)  T(F): 98.2 (19 Oct 2023 06:17), Max: 98.8 (18 Oct 2023 17:35)  HR: 86 (19 Oct 2023 06:00) (86 - 100)  BP: 112/57 (19 Oct 2023 06:00) (97/51 - 112/57)  BP(mean): 70 (19 Oct 2023 06:00) (68 - 70)  RR: 20 (19 Oct 2023 06:17) (18 - 20)  SpO2: 98% (19 Oct 2023 06:17) (98% - 100%)    Parameters below as of 19 Oct 2023 06:17  Patient On (Oxygen Delivery Method): room air        MEDICATIONS  (STANDING):  acetaminophen     Tablet .. 975 milliGRAM(s) Oral <User Schedule>  diphtheria/tetanus/pertussis (acellular) Vaccine (Adacel) 0.5 milliLiter(s) IntraMuscular once  heparin   Injectable 5000 Unit(s) SubCutaneous every 12 hours  ibuprofen  Tablet. 600 milliGRAM(s) Oral every 6 hours  influenza   Vaccine 0.5 milliLiter(s) IntraMuscular once  lactated ringers. 1000 milliLiter(s) (125 mL/Hr) IV Continuous <Continuous>  senna 2 Tablet(s) Oral at bedtime      MEDICATIONS  (PRN):  diphenhydrAMINE 25 milliGRAM(s) Oral every 6 hours PRN Pruritus  lanolin Ointment 1 Application(s) Topical every 6 hours PRN Sore Nipples  magnesium hydroxide Suspension 30 milliLiter(s) Oral two times a day PRN Constipation  oxyCODONE    IR 5 milliGRAM(s) Oral every 3 hours PRN Moderate to Severe Pain (4-10)  oxyCODONE    IR 5 milliGRAM(s) Oral once PRN Moderate to Severe Pain (4-10)  oxyCODONE    IR 5 milliGRAM(s) Oral once PRN Moderate to Severe Pain (4-10)  simethicone 80 milliGRAM(s) Chew every 4 hours PRN Gas      Labs:  Blood type: O Positive  Rubella IgG: RPR: Negative                          6.9<LL>   9.48 >-----------< 257    ( 10-19 @ 05:00 )             21.2<L>                        8.0<L>   14.58<H> >-----------< 257    ( 10-18 @ 06:05 )             24.4<L>                  PE:  General: NAD  Abdomen: unable to assess,patient refused full exam, ncision c/d/i.  : lochia WNL  Extremities: No erythema, no pitting edema

## 2023-10-19 NOTE — PROVIDER CONTACT NOTE (OTHER) - ACTION/TREATMENT ORDERED:
As per MD Josue Mims a new set of orthostatic BP is required and should be communicated to indicate next step in plan of care.

## 2023-10-20 ENCOUNTER — TRANSCRIPTION ENCOUNTER (OUTPATIENT)
Age: 28
End: 2023-10-20

## 2023-10-20 RX ADMIN — HEPARIN SODIUM 5000 UNIT(S): 5000 INJECTION INTRAVENOUS; SUBCUTANEOUS at 00:10

## 2023-10-20 RX ADMIN — ERTAPENEM SODIUM 120 MILLIGRAM(S): 1 INJECTION, POWDER, LYOPHILIZED, FOR SOLUTION INTRAMUSCULAR; INTRAVENOUS at 11:21

## 2023-10-20 RX ADMIN — Medication 600 MILLIGRAM(S): at 06:02

## 2023-10-20 RX ADMIN — Medication 600 MILLIGRAM(S): at 18:38

## 2023-10-20 RX ADMIN — Medication 975 MILLIGRAM(S): at 03:10

## 2023-10-20 RX ADMIN — HEPARIN SODIUM 5000 UNIT(S): 5000 INJECTION INTRAVENOUS; SUBCUTANEOUS at 11:22

## 2023-10-20 RX ADMIN — Medication 600 MILLIGRAM(S): at 13:30

## 2023-10-20 RX ADMIN — Medication 600 MILLIGRAM(S): at 19:30

## 2023-10-20 RX ADMIN — Medication 975 MILLIGRAM(S): at 14:56

## 2023-10-20 RX ADMIN — SENNA PLUS 2 TABLET(S): 8.6 TABLET ORAL at 21:22

## 2023-10-20 RX ADMIN — Medication 600 MILLIGRAM(S): at 00:45

## 2023-10-20 RX ADMIN — Medication 975 MILLIGRAM(S): at 15:36

## 2023-10-20 RX ADMIN — Medication 975 MILLIGRAM(S): at 21:32

## 2023-10-20 RX ADMIN — Medication 975 MILLIGRAM(S): at 20:46

## 2023-10-20 RX ADMIN — Medication 975 MILLIGRAM(S): at 03:45

## 2023-10-20 RX ADMIN — Medication 600 MILLIGRAM(S): at 06:35

## 2023-10-20 RX ADMIN — Medication 325 MILLIGRAM(S): at 11:22

## 2023-10-20 RX ADMIN — Medication 600 MILLIGRAM(S): at 12:32

## 2023-10-20 RX ADMIN — Medication 975 MILLIGRAM(S): at 08:57

## 2023-10-20 RX ADMIN — Medication 600 MILLIGRAM(S): at 00:10

## 2023-10-20 RX ADMIN — Medication 975 MILLIGRAM(S): at 09:50

## 2023-10-20 NOTE — DISCHARGE NOTE OB - INSTRUCTIONS
[facility]  [phone] [place] [address] [place] [address]  [phone]

## 2023-10-20 NOTE — DISCHARGE NOTE OB - PATIENT PORTAL LINK FT
You can access the FollowMyHealth Patient Portal offered by [facility] by registering at the following website: [email or web address]. By joining [facility]’s FollowMyHealth portal, you will also be able to view your health information using other applications (apps) compatible with our system.

## 2023-10-20 NOTE — DISCHARGE NOTE OB - PLAN OF CARE
Make your follow-up appointment with your doctor as ordered.   No heavy lifting, driving, or strenuous activity for 6 weeks.  Nothing per vagina such as tampons, intercourse, douches or tub baths for 6 weeks or until you see your doctor.  Call your doctor if you're unable to tolerate food, increase in vaginal bleeding, or have difficulty urinating. Call your doctor if you have pain that is not relieved by your prescribed medications. Notify your doctor with any other concerns.    [facility]  Please f/u in 2 weeks for an incision check and for a postpartum appointment in 4-6 weeks @Ambulatory Clinic Unit, [facility], Oncology Building, basement floor. Please call the office for an appointment ([phone]) Make your follow-up appointment with your doctor as ordered.   No heavy lifting, driving, or strenuous activity for 6 weeks.  Nothing per vagina such as tampons, intercourse, douches or tub baths for 6 weeks or until you see your doctor.  Call your doctor if you're unable to tolerate food, increase in vaginal bleeding, or have difficulty urinating. Call your doctor if you have pain that is not relieved by your prescribed medications. Notify your doctor with any other concerns.    [facility]  Please f/u in 2 weeks for an incision check and for a postpartum appointment in 4-6 weeks @Ambulatory Clinic Unit, [facility], Oncology Building, basement floor. Please call the office for an appointment ([phone]) Make your follow-up appointment with your doctor as ordered.   No heavy lifting, driving, or strenuous activity for 6 weeks.  Nothing per vagina such as tampons, intercourse, douches or tub baths for 6 weeks or until you see your doctor.  Call your doctor if you're unable to tolerate food, increase in vaginal bleeding, or have difficulty urinating. Call your doctor if you have pain that is not relieved by your prescribed medications. Notify your doctor with any other concerns.    [facility]  Please f/u in 2 weeks for an incision check and for a postpartum appointment in 4-6 weeks @Ambulatory Clinic Unit, [facility], Oncology Building, basement floor. Please call the office for an appointment ([phone])

## 2023-10-20 NOTE — DISCHARGE NOTE OB - CARE PLAN
Principal Discharge DX:	 delivery delivered  Assessment and plan of treatment:	Make your follow-up appointment with your doctor as ordered.   No heavy lifting, driving, or strenuous activity for 6 weeks.  Nothing per vagina such as tampons, intercourse, douches or tub baths for 6 weeks or until you see your doctor.  Call your doctor if you're unable to tolerate food, increase in vaginal bleeding, or have difficulty urinating. Call your doctor if you have pain that is not relieved by your prescribed medications. Notify your doctor with any other concerns.    [facility]  Please f/u in 2 weeks for an incision check and for a postpartum appointment in 4-6 weeks @Ambulatory Clinic Unit, [facility], [facility]. Please call the office for an appointment ([phone])  Secondary Diagnosis:	Anemia  Assessment and plan of treatment:	Make your follow-up appointment with your doctor as ordered.   No heavy lifting, driving, or strenuous activity for 6 weeks.  Nothing per vagina such as tampons, intercourse, douches or tub baths for 6 weeks or until you see your doctor.  Call your doctor if you're unable to tolerate food, increase in vaginal bleeding, or have difficulty urinating. Call your doctor if you have pain that is not relieved by your prescribed medications. Notify your doctor with any other concerns.    [facility]  Please f/u in 2 weeks for an incision check and for a postpartum appointment in 4-6 weeks @Ambulatory Clinic Unit, [facility]. Please call the office for an appointment ([phone])  Secondary Diagnosis:	Endometritis  Assessment and plan of treatment:	Make your follow-up appointment with your doctor as ordered.   No heavy lifting, driving, or strenuous activity for 6 weeks.  Nothing per vagina such as tampons, intercourse, douches or tub baths for 6 weeks or until you see your doctor.  Call your doctor if you're unable to tolerate food, increase in vaginal bleeding, or have difficulty urinating. Call your doctor if you have pain that is not relieved by your prescribed medications. Notify your doctor with any other concerns.    [facility]  Please f/u in 2 weeks for an incision check and for a postpartum appointment in 4-6 weeks @Ambulatory Clinic Unit, [facility]. Please call the office for an appointment ([phone])   1

## 2023-10-20 NOTE — DISCHARGE NOTE OB - PROVIDER TOKENS
[facility]   Progress Note                   Date:   7/15/2021  Patient name: Paco Ochoa  Date of admission:  7/13/2021 12:46 PM  MRN:   [id number]  YOB: 1965  PCP: Ella Jiménez MD    Reason for Admission: Atrial flutter [facility]) [I48.92]    Subjective:       Clinical Changes / Abnormalities: Patient seen and examined with wife present at the bed side. No new acute events overnight. Patient is doing well, has no complaints. Denies chest pain or SOB. Patient states that his breathing is better the past few days. Reviewed vitals, labs, tele, & previous testing. Remains Atrial flutter on monitor with rate 90's. Medications:   Scheduled Meds:   budesonide-formoterol  2 puff Inhalation BID    sodium chloride flush  5-40 mL Intravenous 2 times per day     Continuous Infusions:   sodium chloride      heparin (PORCINE) Infusion 16.4 Units/kg/hr (07/15/21 0521)     CBC:   Recent Labs     07/13/21  1308   WBC 6.0   HGB 15.1   PLT See Reflexed IPF Result     BMP:    Recent Labs     07/13/21  1430 07/14/21  0237 07/15/21  0235    139 138   K 4.3 4.0 4.1    106 105   CO2 23 22 21   BUN 16 14 16   CREATININE 0.99 1.07 1.06   GLUCOSE 95 102* 108*     Hepatic:   Recent Labs     07/13/21  1430   AST 20   ALT 28   BILITOT 0.34   ALKPHOS 52     Troponin:   Recent Labs     07/13/21  1430   TROPHS <6     BNP: No results for input(s): BNP in the last 72 hours. Lipids: No results for input(s): CHOL, HDL in the last 72 hours. Invalid input(s): LDLCALCU  INR: No results for input(s): INR in the last 72 hours. Objective:   Vitals: BP (!) 121/90   Pulse 93   Temp 97.7 °F (36.5 °C) (Oral)   Resp 20   Ht 6' (1.829 m)   Wt 209 lb 14.4 oz (95.2 kg)   SpO2 95%   BMI 28.47 kg/m²   General appearance: alert and cooperative with exam  HEENT: Head: Normocephalic, no lesions, without obvious abnormality.   Neck: no JVD, trachea midline, no adenopathy  Lungs: Clear to auscultation  Heart: Irregular rate and rhythm, s1/s2 auscultated, no murmurs. Atrial Flutter. Abdomen: soft, non-tender, bowel sounds active  Extremities: no edema  Neurologic: not done        Assessment / Acute Cardiac Problems:   1. Newly diagnosed  Atrial flutter: -130s, symptomatic  2. Shortness of breath: likely due to #1  3. Easy fatigability: likely due to #1  4. CHADS-Vasc score of 0    Patient Active Problem List:     Otitis media     Tendonitis of shoulder     Fatigue     Herniated disc, cervical     Bilateral carotid artery stenosis     Atrial flutter (HCC)     Exercise-induced asthma    [id number] Score for Atrial Fibrillation Stroke Risk   Risk   Factors  Component Value   C CHF No 0   H HTN No 0   A2 Age >= 75 No,  (56 y.o.) 0   D DM No 0   S2 Prior Stroke/TIA No 0   V Vascular Disease No 0   A Age 74-69 No,  (56 y.o.) 0   Sc Sex male 0    [id number]  Score  0   Score last updated 7/15/21 03:57 AM EDT    Click here for a link to the UpToDate guideline \"Atrial Fibrillation: Anticoagulation therapy to prevent embolization    Disclaimer: Risk Score calculation is dependent on accuracy of patient problem list and past encounter diagnosis. Plan of Treatment:   1. Plan for DIANNA and CV this afternoon with anesthesia. 2. Atrial Flutter. Rate stable in the 90's. Continue heparin drip. No SOB. 3. Further orders pending DIANNA findings and CV.         Electronically signed by FELY Massey CNP on 7/15/2021 at 10:53 [facility]      [phone] FREE:[LAST:[[facility]],PHONE:[phone]],FAX:[(   )    -],ADDRESS:[[phone] [place] [address]],FOLLOWUP:[2 weeks]]

## 2023-10-20 NOTE — DISCHARGE NOTE OB - FINDINGS/TREATMENT
Make your follow-up appointment with your doctor as ordered.   No heavy lifting, driving, or strenuous activity for 6 weeks.  Nothing per vagina such as tampons, intercourse, douches or tub baths for 6 weeks or until you see your doctor.  Call your doctor if you're unable to tolerate food, increase in vaginal bleeding, or have difficulty urinating. Call your doctor if you have pain that is not relieved by your prescribed medications. Notify your doctor with any other concerns.    [facility]  Please f/u in 2 weeks for an incision check and for a postpartum appointment in 4-6 weeks @Ambulatory Clinic Unit, [facility], Oncology Building, basement floor. Please call the office for an appointment ([phone])

## 2023-10-20 NOTE — PROGRESS NOTE ADULT - PROBLEM SELECTOR PLAN 3
- Hct: 34.4->24.4->21.2->1UpRBCs->24.9  - Asymptomatic   - Resolved
- Hct: 34.4->24.4->21.2  - Asymptomatic   - Transfuse 1U pRBCs -> 4 hr post transfusion CBC

## 2023-10-20 NOTE — DISCHARGE NOTE OB - HOSPITAL COURSE
Patient was admitted to L+D for ROM and breech presentation, Pt had a pLTCS for ROM and breech presentation. Course was complicated by chorioamnionitis w/ maternal temp and fetal tachycardia and patient received ampicillin, gentamycin, clindamycin. She progressed to endometritis due to significant abdominal tenderness and received Invanz (10/18-10/19). Patient received Patient also had a drop in hemoglobin/ hematocrit and received 1 unit of blood and improved appropriately.   EBL: 466  Hct: 34.3->24.4->21.2->1UpRBCs-> 24.9  WBC: 11.7->14.6->9.48  On Postpartum day 3, patient was discharged home in stable condition, voiding spontaneously, pain well controlled, ambulating, tolerating PO and with normal vital signs. Patient declines birth control at discharge. Pt plans to follow up in two weeks w/ her primary doctor, Farooq Pickens for incision check and in 6 weeks for postpartum visit. Telephone number and clinic information provided prior to discharge.  Patient was admitted to L+D for ROM and breech presentation, Pt had a pLTCS for ROM and breech presentation. Course was complicated by chorioamnionitis w/ maternal temp and fetal tachycardia and patient received ampicillin, gentamycin, clindamycin. She progressed to endometritis due to significant abdominal tenderness and received Invanz (10/18-10/19). Patient received Patient also had a drop in hemoglobin/ hematocrit and received 1 unit of blood and improved appropriately. Patient reports subjective improvement on POD3.  EBL: 466  Hct: 34.3->24.4->21.2->1UpRBCs-> 24.9  WBC: 11.7->14.6->9.48  On Postpartum day 3, patient was discharged home in stable condition, voiding spontaneously, pain well controlled, ambulating, tolerating PO and with normal vital signs. Patient declines birth control at discharge. Pt plans to follow up in two weeks in [facility] clinic w/ for incision check and in 6 weeks for postpartum visit. Telephone number and clinic information provided prior to discharge.  Patient was admitted to L+D for ROM and breech presentation, Pt had a pLTCS for ROM and breech presentation. Course was complicated by chorioamnionitis w/ maternal temp and fetal tachycardia and patient received ampicillin, gentamycin, clindamycin. She progressed to endometritis due to significant abdominal tenderness and received Invanz (10/18-10/19). Patient received Patient also had a drop in hemoglobin/ hematocrit and received 1 unit of blood and improved appropriately on POD2. Patient reports subjective improvement on POD3 but not yet ready for discharge.  EBL: 466  Hct: 34.3->24.4->21.2->1UpRBCs-> 24.9  WBC: 11.7->14.6->9.48  On Postpartum day 4, patient was discharged home in stable condition, voiding spontaneously, pain well controlled, ambulating, tolerating PO and with normal vital signs. Patient declines birth control at discharge. Pt plans to follow up in two weeks in [facility] clinic w/ for incision check and in 6 weeks for postpartum visit. Telephone number and clinic information provided prior to discharge.

## 2023-10-20 NOTE — PROGRESS NOTE ADULT - PROBLEM SELECTOR PLAN 2
- Prior treatment of chorioamnionitis in the setting of Tmax38.4 and fetal tachycardia  -  s/p Amp/Gent/ Clinda   - 8/10 abdominal pain this AM   - Afebrile  - WBC: 11.7->14.58  - Start Invanz, continue till pain <5/10
- pain subjective improved  - WBC: 11.69->14.58-> 9.48->7.79  - Afebrile  - D/C Invanz
- Unable to fully assess abdomen, pain subjective improved  - WBC: 11.69->14.58-> 9.48  - Afebrile  - Continue Invanz, likely D/C tomorrow

## 2023-10-20 NOTE — PROGRESS NOTE ADULT - SUBJECTIVE AND OBJECTIVE BOX
OB Postpartum Note:  Delivery, POD#3    S: 27yo POD#3 s/p pLTCS for arrest of descent c/b by chorioamnionitis then endometritis. The patient feels well.  Pain is well controlled. She is tolerating a regular diet and passing flatus. She is voiding spontaneously, and ambulating without difficulty. Denies CP/SOB. Denies lightheadedness/dizziness. Denies N/V.    O:  Vitals:  Vital Signs Last 24 Hrs  T(C): 37 (20 Oct 2023 01:), Max: 37 (20 Oct 2023 01:)  T(F): 98.6 (20 Oct 2023 01:), Max: 98.6 (20 Oct 2023 01:)  HR: 91 (20 Oct 2023 01:26) (86 - 103)  BP: 131/69 (20 Oct 2023 01:) (112/57 - 131/69)  BP(mean): 70 (19 Oct 2023 06:00) (70 - 70)  RR: 18 (20 Oct 2023 01:26) (16 - 20)  SpO2: 100% (20 Oct 2023 01:) (98% - 100%)    Parameters below as of 20 Oct 2023 01:  Patient On (Oxygen Delivery Method): room air        MEDICATIONS  (STANDING):  acetaminophen     Tablet .. 975 milliGRAM(s) Oral <User Schedule>  diphtheria/tetanus/pertussis (acellular) Vaccine (Adacel) 0.5 milliLiter(s) IntraMuscular once  ertapenem  IVPB 1000 milliGRAM(s) IV Intermittent every 24 hours  ferrous    sulfate 325 milliGRAM(s) Oral daily  heparin   Injectable 5000 Unit(s) SubCutaneous every 12 hours  ibuprofen  Tablet. 600 milliGRAM(s) Oral every 6 hours  influenza   Vaccine 0.5 milliLiter(s) IntraMuscular once  lactated ringers. 1000 milliLiter(s) (125 mL/Hr) IV Continuous <Continuous>  senna 2 Tablet(s) Oral at bedtime    MEDICATIONS  (PRN):  diphenhydrAMINE 25 milliGRAM(s) Oral every 6 hours PRN Pruritus  lanolin Ointment 1 Application(s) Topical every 6 hours PRN Sore Nipples  magnesium hydroxide Suspension 30 milliLiter(s) Oral two times a day PRN Constipation  oxyCODONE    IR 5 milliGRAM(s) Oral once PRN Moderate to Severe Pain (4-10)  oxyCODONE    IR 5 milliGRAM(s) Oral once PRN Moderate to Severe Pain (4-10)  oxyCODONE    IR 5 milliGRAM(s) Oral every 3 hours PRN Moderate to Severe Pain (4-10)  simethicone 80 milliGRAM(s) Chew every 4 hours PRN Gas      LABS:  Blood type: O Positive  Rubella IgG: RPR: Negative                          8.3<L>   7.79 >-----------< 283    ( 10-19 @ 20:00 )             24.9<L>                        6.9<LL>   9.48 >-----------< 257    ( 10-19 @ 05:00 )             21.2<L>                        8.0<L>   14.58<H> >-----------< 257    ( 10-18 @ 06:05 )             24.4<L>                  Physical exam:  Gen: NAD  Abdomen: Soft, nontender, no distension , firm uterine fundus at umbilicus.  Incision: Clean, dry, and intact   Pelvic: Normal lochia noted  Ext: No calf tenderness             OB Postpartum Note:  Delivery, POD#3    S: 29yo POD#3 s/p pLTCS for arrest of descent c/b by chorioamnionitis then endometritis. The patient feels well.  Pain is well controlled. She is tolerating a regular diet and passing flatus. She is voiding spontaneously, and ambulating without difficulty. Denies CP/SOB. Denies lightheadedness/dizziness. Denies N/V. Patient feels ready for discharge today.     O:  Vitals:  Vital Signs Last 24 Hrs  T(C): 37 (20 Oct 2023 01:), Max: 37 (20 Oct 2023 01:)  T(F): 98.6 (20 Oct 2023 01:), Max: 98.6 (20 Oct 2023 01:26)  HR: 91 (20 Oct 2023 01:) (86 - 103)  BP: 131/69 (20 Oct 2023 01:) (112/57 - 131/69)  BP(mean): 70 (19 Oct 2023 06:00) (70 - 70)  RR: 18 (20 Oct 2023 01:) (16 - 20)  SpO2: 100% (20 Oct 2023 01:) (98% - 100%)    Parameters below as of 20 Oct 2023 01:  Patient On (Oxygen Delivery Method): room air        MEDICATIONS  (STANDING):  acetaminophen     Tablet .. 975 milliGRAM(s) Oral <User Schedule>  diphtheria/tetanus/pertussis (acellular) Vaccine (Adacel) 0.5 milliLiter(s) IntraMuscular once  ertapenem  IVPB 1000 milliGRAM(s) IV Intermittent every 24 hours  ferrous    sulfate 325 milliGRAM(s) Oral daily  heparin   Injectable 5000 Unit(s) SubCutaneous every 12 hours  ibuprofen  Tablet. 600 milliGRAM(s) Oral every 6 hours  influenza   Vaccine 0.5 milliLiter(s) IntraMuscular once  lactated ringers. 1000 milliLiter(s) (125 mL/Hr) IV Continuous <Continuous>  senna 2 Tablet(s) Oral at bedtime    MEDICATIONS  (PRN):  diphenhydrAMINE 25 milliGRAM(s) Oral every 6 hours PRN Pruritus  lanolin Ointment 1 Application(s) Topical every 6 hours PRN Sore Nipples  magnesium hydroxide Suspension 30 milliLiter(s) Oral two times a day PRN Constipation  oxyCODONE    IR 5 milliGRAM(s) Oral once PRN Moderate to Severe Pain (4-10)  oxyCODONE    IR 5 milliGRAM(s) Oral once PRN Moderate to Severe Pain (4-10)  oxyCODONE    IR 5 milliGRAM(s) Oral every 3 hours PRN Moderate to Severe Pain (4-10)  simethicone 80 milliGRAM(s) Chew every 4 hours PRN Gas      LABS:  Blood type: O Positive  Rubella IgG: RPR: Negative                          8.3<L>   7.79 >-----------< 283    ( 10-19 @ 20:00 )             24.9<L>                        6.9<LL>   9.48 >-----------< 257    ( 10-19 @ 05:00 )             21.2<L>                        8.0<L>   14.58<H> >-----------< 257    ( 10-18 @ 06:05 )             24.4<L>                  Physical exam:  Gen: NAD  Abdomen: Soft, nontender, no distension , firm uterine fundus at umbilicus.  Incision: Clean, dry, and intact   Pelvic: Normal lochia noted  Ext: No calf tenderness

## 2023-10-20 NOTE — PROGRESS NOTE ADULT - PROBLEM SELECTOR PLAN 1
- Continue motrin, tylenol, oxycodone PRN for pain control.  - Increase ambulation  - Continue regular diet  - Discharge planning
patient stated/observation
- Continue regular diet.  - Increase ambulation.  - Continue motrin, tylenol, oxycodone PRN for pain control.
- Hct: 34.3-> 24.4  - QBL: 466  - Continue regular diet.  - Increase ambulation.  - Continue motrin, tylenol, oxycodone PRN for pain control, continue simethicone for gas pain

## 2023-10-20 NOTE — DISCHARGE NOTE OB - CARE PROVIDER_API CALL
[facility],   [address] [place] [address] [place] [address]  Phone: [phone]  Fax: (   )    -  Follow Up Time: 2 weeks

## 2023-10-21 VITALS
HEART RATE: 89 BPM | DIASTOLIC BLOOD PRESSURE: 75 MMHG | RESPIRATION RATE: 17 BRPM | TEMPERATURE: 99 F | SYSTOLIC BLOOD PRESSURE: 122 MMHG | OXYGEN SATURATION: 100 %

## 2023-10-21 RX ADMIN — Medication 600 MILLIGRAM(S): at 06:30

## 2023-10-21 RX ADMIN — Medication 975 MILLIGRAM(S): at 10:30

## 2023-10-21 RX ADMIN — Medication 600 MILLIGRAM(S): at 01:05

## 2023-10-21 RX ADMIN — HEPARIN SODIUM 5000 UNIT(S): 5000 INJECTION INTRAVENOUS; SUBCUTANEOUS at 00:23

## 2023-10-21 RX ADMIN — Medication 600 MILLIGRAM(S): at 00:23

## 2023-10-21 RX ADMIN — Medication 975 MILLIGRAM(S): at 09:36

## 2023-10-21 NOTE — PROGRESS NOTE ADULT - ATTENDING COMMENTS
OB Attending    Patient POD#4 s/p C/S. seen and examined at bedside. No acute events  Exam benign    Plan  -meeting post op milestones  -d/c home today    N Sample-MD Manjeet
Associate Chief of L & D ( late entry)    I have met this patient for the first time today.   She received her care with Dr Lloyd out of [facility] and was admitted by Dr Martínez  and delivered by DR Ho   OB Progress Note:  Delivery, POD#1    S: 29yo POD#1 s/p LTCS .  patient c/o left lower quadrant pain     O:   Vital Signs Last 24 Hrs  T(C): 36.6 (18 Oct 2023 10:27), Max: 36.9 (18 Oct 2023 01:45)  T(F): 97.9 (18 Oct 2023 10:27), Max: 98.4 (18 Oct 2023 01:45)  HR: 98 (18 Oct 2023 10:27) (81 - 98)  BP: 101/51 (18 Oct 2023 10:27) (92/59 - 121/71)  RR: 18 (18 Oct 2023 10:27) (18 - 20)  SpO2: 98% (18 Oct 2023 10:27) (98% - 100%)    Parameters below as of 18 Oct 2023 06:02  Patient On (Oxygen Delivery Method): room air        Labs:  Blood type: O Positive  Rubella IgG: RPR: Negative                          8.0<L>   14.58<H> >-----------< 257    ( 10-18 @ 06:05 )             24.4<L>                        11.2<L>   11.69<H> >-----------< 317    ( 10-15 @ 23:20 )             34.3<L>        PE:    Abdomen: soft, fundus firm, Mildly distended,  tenderness 8/10  incision c/d/i.  Extremities: No erythema, trace  edema    A/P: 29yo POD#1 s/p LTCS due to CAT II arrest of decent complicated by chorioamnionitis now with endometritis    - Continue regular diet.  - Increase ambulation.  - Continue Motrin, Tylenol, oxycodone PRN for pain control.  vs. continue PCEA for pain.  - F/u AM CBC  - follow uterine tenderness clinically   - Continue Invanz until symptoms improve  - Full discuss was had with both patient and her  and answered all questions    Patti Sears M.D., M.B.A., M.S.
Associate Chief of L & D ( late entry)      OB Progress Note:  Delivery, POD#3    S: 29yo POD#3 s/p LTCS .  patient reported that she feels excellent today    O:   Vital Signs Last 24 Hrs  T(C): 37 (20 Oct 2023 10:22), Max: 37 (20 Oct 2023 01:26)  T(F): 98.6 (20 Oct 2023 10:22), Max: 98.6 (20 Oct 2023 01:26)  HR: 93 (20 Oct 2023 10:22) (83 - 103)  BP: 126/75 (20 Oct 2023 10:22) (110/60 - 131/69)  RR: 17 (20 Oct 2023 10:22) (16 - 18)  SpO2: 100% (20 Oct 2023 10:) (99% - 100%)    Parameters below as of 20 Oct 2023 10:22  Patient On (Oxygen Delivery Method): room air                Labs:  Blood type: O Positive  Rubella IgG: RPR: Negative    LABS:    LABS:                        8.3    7.79  )-----------( 283      ( 19 Oct 2023 20:00 )             24.9                         6.9    9.48  )-----------( 257      ( 19 Oct 2023 05:00 )             21.2                           8.0<L>   14.58<H> >-----------< 257    ( 1018 @ 06:05 )             24.4<L>                        11.2<L>   11.69<H> >-----------< 317    ( 10-15 @ 23:20 )             34.3<L>        PE:    Abdomen: soft, fundus firm, Mildly distended,  tenderness  0.5/10  incision c/d/i.  Extremities: No erythema, trace edema      A/P: 29yo POD#3 s/p LTCS due to CAT II arrest of decent complicated by chorioamnionitis now with improved endometritis and acute blood loss anemia s/p 1 unit of PRBC     - Patient is stable for discharge and will follow at the  clinic      Patti Sears M.D., M.B.A., M.S.
Associate Chief of L & D ( late entry)    via  # [id number]    OB Progress Note:  Delivery, POD#2    S: 27yo POD#2 s/p LTCS .  patient c/o left lower quadrant pain     O:   Vital Signs Last 24 Hrs  T(C): 36.8 (19 Oct 2023 06:17), Max: 37.1 (18 Oct 2023 17:35)  T(F): 98.2 (19 Oct 2023 06:17), Max: 98.8 (18 Oct 2023 17:35)  HR: 86 (19 Oct 2023 06:00) (86 - 100)  BP: 112/57 (19 Oct 2023 06:00) (97/51 - 112/57)  BP(mean): 70 (19 Oct 2023 06:00) (68 - 70)  RR: 20 (19 Oct 2023 06:17) (18 - 20)  SpO2: 98% (19 Oct 2023 06:17) (98% - 100%)    Parameters below as of 19 Oct 2023 06:17  Patient On (Oxygen Delivery Method): room air            Labs:  Blood type: O Positive  Rubella IgG: RPR: Negative    LABS:                        6.9    9.48  )-----------( 257      ( 19 Oct 2023 05:00 )             21.2                           8.0<L>   14.58<H> >-----------< 257    ( 10-18 @ 06:05 )             24.4<L>                        11.2<L>   11.69<H> >-----------< 317    ( 10-15 @ 23:20 )             34.3<L>        PE:    Abdomen: soft, fundus firm, Mildly distended,  tenderness 5/10  incision c/d/i.  Extremities: No erythema, trace  edema      A/P: 27yo POD#2 s/p LTCS due to CAT II arrest of decent complicated by chorioamnionitis now with endometritis and acute blood loss anemia     - Continue regular diet.  - Increase ambulation.  - Continue Motrin, Tylenol, oxycodone PRN for pain control.  vs. continue PCEA for pain.  - F/u AM CBC  - follow uterine tenderness clinically   - Continue Invanz until symptoms improve  - Transfuse 1 unit of PRBC  - Full discuss was had with both patient and her  and answered all questions    Patti Sears M.D., M.B.A., M.S.

## 2023-10-21 NOTE — PROGRESS NOTE ADULT - ASSESSMENT
A/P: 29yo POD#2 s/p pLTCS for arrest of descent c/b chorio (s/p ampicillin gentamycin and clindamycin) now w/ endometritis due to clinical presentation on Invanz.  Patient is stable and doing well post-operatively.        Ann Fuentes, PGY-1
A/P: 27yo POD#3 s/p LTCS.  Patient is stable and is doing well post-operatively.      #Anemia    - Hct: 34.4->24.4->21.2->1UpRBCs->24.9  - Asymptomatic   - Resolved    #Endometritis  - pain subjective improved  - WBC: 11.69->14.58-> 9.48->7.79  - Afebrile  - D/C Invanz    #PP  - Continue motrin, tylenol, oxycodone PRN for pain control.  - Increase ambulation  - Continue regular diet  - Discharge planning    Ann Fuentes, PGY-1  
A/P: 29yo POD#3 s/p pLTCS for arrest of descent c/b by chorioamnionitis then endometritis.  Patient is stable and is doing well post-operatively.      Ann Fuentes, PGY-1
A/P: 27yo POD#1 s/p  pLTCS for arrest of descent c/b chorio (s/p ampicillin gentamycin and clindamycin).  Patient with continued abdominal tenderness.         Ann Fuentes, PGY-1

## 2023-10-21 NOTE — PROGRESS NOTE ADULT - SUBJECTIVE AND OBJECTIVE BOX
OB Postpartum Note:  Delivery, POD#4    S: 29yo POD#4 s/p pLTCS for arrest of descent c/b by chorioamnionitis then endometritis. The patient feels well.  Pain is well controlled. She is tolerating a regular diet and passing flatus. She is voiding spontaneously, and ambulating without difficulty. Denies CP/SOB. Denies lightheadedness/dizziness. Denies N/V.    O:  Vitals:  Vital Signs Last 24 Hrs  T(C): 36.8 (20 Oct 2023 22:33), Max: 37 (20 Oct 2023 10:22)  T(F): 98.3 (20 Oct 2023 22:33), Max: 98.6 (20 Oct 2023 10:22)  HR: 80 (20 Oct 2023 22:33) (80 - 94)  BP: 123/72 (20 Oct 2023 22:33) (110/60 - 126/75)  BP(mean): --  RR: 16 (20 Oct 2023 22:33) (16 - 18)  SpO2: 99% (20 Oct 2023 22:33) (99% - 100%)    Parameters below as of 20 Oct 2023 22:33  Patient On (Oxygen Delivery Method): room air        MEDICATIONS  (STANDING):  acetaminophen     Tablet .. 975 milliGRAM(s) Oral <User Schedule>  diphtheria/tetanus/pertussis (acellular) Vaccine (Adacel) 0.5 milliLiter(s) IntraMuscular once  ferrous    sulfate 325 milliGRAM(s) Oral daily  heparin   Injectable 5000 Unit(s) SubCutaneous every 12 hours  ibuprofen  Tablet. 600 milliGRAM(s) Oral every 6 hours  influenza   Vaccine 0.5 milliLiter(s) IntraMuscular once  senna 2 Tablet(s) Oral at bedtime    MEDICATIONS  (PRN):  diphenhydrAMINE 25 milliGRAM(s) Oral every 6 hours PRN Pruritus  lanolin Ointment 1 Application(s) Topical every 6 hours PRN Sore Nipples  magnesium hydroxide Suspension 30 milliLiter(s) Oral two times a day PRN Constipation  oxyCODONE    IR 5 milliGRAM(s) Oral once PRN Moderate to Severe Pain (4-10)  oxyCODONE    IR 5 milliGRAM(s) Oral every 3 hours PRN Moderate to Severe Pain (4-10)  oxyCODONE    IR 5 milliGRAM(s) Oral once PRN Moderate to Severe Pain (4-10)  simethicone 80 milliGRAM(s) Chew every 4 hours PRN Gas      LABS:  Blood type: O Positive  Rubella IgG: RPR: Negative                          8.3<L>   7.79 >-----------< 283    ( 10-19 @ 20:00 )             24.9<L>                        6.9<LL>   9.48 >-----------< 257    ( 10-19 @ 05:00 )             21.2<L>                        8.0<L>   14.58<H> >-----------< 257    ( 10-18 @ 06:05 )             24.4<L>                  Physical exam:  Gen: NAD  Abdomen: Soft, nontender, no distension , firm uterine fundus at umbilicus.  Incision: Clean, dry, and intact   Pelvic: Normal lochia noted  Ext: No calf tenderness

## 2023-10-25 PROBLEM — Z78.9 OTHER SPECIFIED HEALTH STATUS: Chronic | Status: ACTIVE | Noted: 2023-10-15

## 2023-11-01 PROBLEM — Z00.00 ENCOUNTER FOR PREVENTIVE HEALTH EXAMINATION: Status: ACTIVE | Noted: 2023-11-01

## 2023-11-06 ENCOUNTER — APPOINTMENT (OUTPATIENT)
Dept: OBGYN | Facility: HOSPITAL | Age: 28
End: 2023-11-06

## 2023-11-06 ENCOUNTER — RESULT REVIEW (OUTPATIENT)
Age: 28
End: 2023-11-06

## 2023-11-06 ENCOUNTER — OUTPATIENT (OUTPATIENT)
Dept: OUTPATIENT SERVICES | Facility: HOSPITAL | Age: 28
LOS: 1 days | End: 2023-11-06

## 2023-11-06 VITALS
WEIGHT: 130 LBS | DIASTOLIC BLOOD PRESSURE: 65 MMHG | HEIGHT: 62 IN | BODY MASS INDEX: 23.92 KG/M2 | HEART RATE: 84 BPM | TEMPERATURE: 98.1 F | SYSTOLIC BLOOD PRESSURE: 108 MMHG

## 2023-11-06 DIAGNOSIS — D64.9 ANEMIA, UNSPECIFIED: ICD-10-CM

## 2023-11-06 LAB
BASOPHILS # BLD AUTO: 0.08 K/UL — SIGNIFICANT CHANGE UP (ref 0–0.2)
BASOPHILS # BLD AUTO: 0.08 K/UL — SIGNIFICANT CHANGE UP (ref 0–0.2)
BASOPHILS NFR BLD AUTO: 0.9 % — SIGNIFICANT CHANGE UP (ref 0–2)
BASOPHILS NFR BLD AUTO: 0.9 % — SIGNIFICANT CHANGE UP (ref 0–2)
EOSINOPHIL # BLD AUTO: 0.21 K/UL — SIGNIFICANT CHANGE UP (ref 0–0.5)
EOSINOPHIL # BLD AUTO: 0.21 K/UL — SIGNIFICANT CHANGE UP (ref 0–0.5)
EOSINOPHIL NFR BLD AUTO: 2.2 % — SIGNIFICANT CHANGE UP (ref 0–6)
EOSINOPHIL NFR BLD AUTO: 2.2 % — SIGNIFICANT CHANGE UP (ref 0–6)
HCT VFR BLD CALC: 37.5 % — SIGNIFICANT CHANGE UP (ref 34.5–45)
HCT VFR BLD CALC: 37.5 % — SIGNIFICANT CHANGE UP (ref 34.5–45)
HGB BLD-MCNC: 12.1 G/DL — SIGNIFICANT CHANGE UP (ref 11.5–15.5)
HGB BLD-MCNC: 12.1 G/DL — SIGNIFICANT CHANGE UP (ref 11.5–15.5)
IANC: 6.45 K/UL — SIGNIFICANT CHANGE UP (ref 1.8–7.4)
IANC: 6.45 K/UL — SIGNIFICANT CHANGE UP (ref 1.8–7.4)
IMM GRANULOCYTES NFR BLD AUTO: 0.4 % — SIGNIFICANT CHANGE UP (ref 0–0.9)
IMM GRANULOCYTES NFR BLD AUTO: 0.4 % — SIGNIFICANT CHANGE UP (ref 0–0.9)
LYMPHOCYTES # BLD AUTO: 1.84 K/UL — SIGNIFICANT CHANGE UP (ref 1–3.3)
LYMPHOCYTES # BLD AUTO: 1.84 K/UL — SIGNIFICANT CHANGE UP (ref 1–3.3)
LYMPHOCYTES # BLD AUTO: 19.6 % — SIGNIFICANT CHANGE UP (ref 13–44)
LYMPHOCYTES # BLD AUTO: 19.6 % — SIGNIFICANT CHANGE UP (ref 13–44)
MCHC RBC-ENTMCNC: 29.4 PG — SIGNIFICANT CHANGE UP (ref 27–34)
MCHC RBC-ENTMCNC: 29.4 PG — SIGNIFICANT CHANGE UP (ref 27–34)
MCHC RBC-ENTMCNC: 32.3 GM/DL — SIGNIFICANT CHANGE UP (ref 32–36)
MCHC RBC-ENTMCNC: 32.3 GM/DL — SIGNIFICANT CHANGE UP (ref 32–36)
MCV RBC AUTO: 91.2 FL — SIGNIFICANT CHANGE UP (ref 80–100)
MCV RBC AUTO: 91.2 FL — SIGNIFICANT CHANGE UP (ref 80–100)
MONOCYTES # BLD AUTO: 0.75 K/UL — SIGNIFICANT CHANGE UP (ref 0–0.9)
MONOCYTES # BLD AUTO: 0.75 K/UL — SIGNIFICANT CHANGE UP (ref 0–0.9)
MONOCYTES NFR BLD AUTO: 8 % — SIGNIFICANT CHANGE UP (ref 2–14)
MONOCYTES NFR BLD AUTO: 8 % — SIGNIFICANT CHANGE UP (ref 2–14)
NEUTROPHILS # BLD AUTO: 6.45 K/UL — SIGNIFICANT CHANGE UP (ref 1.8–7.4)
NEUTROPHILS # BLD AUTO: 6.45 K/UL — SIGNIFICANT CHANGE UP (ref 1.8–7.4)
NEUTROPHILS NFR BLD AUTO: 68.9 % — SIGNIFICANT CHANGE UP (ref 43–77)
NEUTROPHILS NFR BLD AUTO: 68.9 % — SIGNIFICANT CHANGE UP (ref 43–77)
NRBC # BLD: 0 /100 WBCS — SIGNIFICANT CHANGE UP (ref 0–0)
NRBC # BLD: 0 /100 WBCS — SIGNIFICANT CHANGE UP (ref 0–0)
NRBC # FLD: 0 K/UL — SIGNIFICANT CHANGE UP (ref 0–0)
NRBC # FLD: 0 K/UL — SIGNIFICANT CHANGE UP (ref 0–0)
PLATELET # BLD AUTO: 488 K/UL — HIGH (ref 150–400)
PLATELET # BLD AUTO: 488 K/UL — HIGH (ref 150–400)
RBC # BLD: 4.11 M/UL — SIGNIFICANT CHANGE UP (ref 3.8–5.2)
RBC # BLD: 4.11 M/UL — SIGNIFICANT CHANGE UP (ref 3.8–5.2)
RBC # FLD: 14.9 % — HIGH (ref 10.3–14.5)
RBC # FLD: 14.9 % — HIGH (ref 10.3–14.5)
WBC # BLD: 9.37 K/UL — SIGNIFICANT CHANGE UP (ref 3.8–10.5)
WBC # BLD: 9.37 K/UL — SIGNIFICANT CHANGE UP (ref 3.8–10.5)
WBC # FLD AUTO: 9.37 K/UL — SIGNIFICANT CHANGE UP (ref 3.8–10.5)
WBC # FLD AUTO: 9.37 K/UL — SIGNIFICANT CHANGE UP (ref 3.8–10.5)

## 2023-11-07 DIAGNOSIS — D64.9 ANEMIA, UNSPECIFIED: ICD-10-CM

## 2023-11-07 DIAGNOSIS — Z98.891 HISTORY OF UTERINE SCAR FROM PREVIOUS SURGERY: ICD-10-CM

## 2023-11-07 DIAGNOSIS — Z30.09 ENCOUNTER FOR OTHER GENERAL COUNSELING AND ADVICE ON CONTRACEPTION: ICD-10-CM

## 2023-11-07 DIAGNOSIS — Z51.89 ENCOUNTER FOR OTHER SPECIFIED AFTERCARE: ICD-10-CM

## 2023-11-09 LAB
SURGICAL PATHOLOGY STUDY: SIGNIFICANT CHANGE UP
SURGICAL PATHOLOGY STUDY: SIGNIFICANT CHANGE UP

## 2023-12-07 ENCOUNTER — OUTPATIENT (OUTPATIENT)
Dept: OUTPATIENT SERVICES | Facility: HOSPITAL | Age: 28
LOS: 1 days | End: 2023-12-07

## 2023-12-07 ENCOUNTER — APPOINTMENT (OUTPATIENT)
Dept: OBGYN | Facility: HOSPITAL | Age: 28
End: 2023-12-07
Payer: MEDICAID

## 2023-12-07 VITALS
BODY MASS INDEX: 23.92 KG/M2 | HEIGHT: 62 IN | TEMPERATURE: 97.4 F | SYSTOLIC BLOOD PRESSURE: 116 MMHG | HEART RATE: 76 BPM | DIASTOLIC BLOOD PRESSURE: 69 MMHG | WEIGHT: 130 LBS

## 2023-12-07 DIAGNOSIS — Z98.891 ENCOUNTER FOR ROUTINE POSTPARTUM FOLLOW-UP: ICD-10-CM

## 2023-12-07 PROCEDURE — 99212 OFFICE O/P EST SF 10 MIN: CPT | Mod: GC

## 2024-01-28 NOTE — HISTORY OF PRESENT ILLNESS
[Resumed Menses] : has not resumed her menses [Resumed [place]] : has not resumed intercourse [Abdominal Pain] : no abdominal pain [Breast Pain] : no breast pain [BreastFeeding Problems] : no breastfeeding problems [Chest Pain] : no chest pain [Cracked Nipples] : no cracked nipples [S/Sx PP Depression] : no signs/symptoms of postpartum depression [Heavy Bleeding] : no heavy bleeding [Incisional Drainage] : no incisional drainage [Incisional Pain] : no incisional pain [Vaginal Discharge] : no vaginal discharge [Chills] : no chills [Fatigue] : no fatigue [Dysuria] : no dysuria [Fever] : no fever [Headache] : no headache [Nausea] : no nausea [Vomiting] : no vomiting [FreeTextEntry8] : 29yo [facility] 2/2 arrest of descent     29yo s/p pLTCS (10/17/23) 2/2 arrest of descent complicated by chorioamnionitis and endometritis presenting for follow up postpartum visit.  [FreeTextEntry1] : 29yo 7 weeks s/p pLTCS 2/2 arrest of descent complicated by chorio and endometritis presenting for postpartum visit.   - Patient without complaints or acute exam findings - VSS - Incision healing well - Breastfeeding without difficulty - Plans to abstain from sexual intercourse until her marriage in six months; counseled on contraceptive options []RTC for annual   Seen and evaluated with Dr. Kirby Sorenson PGY4

## 2024-07-02 NOTE — OB PROVIDER H&P - NS_GBS_INFANT_INVASIVE_OBGYN_ALL_OB_FT
Evidence of chronic strokes on CT scan  Patient unable to tolerated MRI  Was loaded with aspirin and continued on ASA 81 and Atorvastatin daily  TTE performed  Neurology consulted for further recs  Outpatient neurology referral sent   N/A

## 2025-03-24 NOTE — OB PROVIDER LABOR PROGRESS NOTE - NSVAGINALEXAM_OBGYN_ALL_OB_DT
Date of service: 3/24/2025  Referring provider: No ref. provider found    Subjective:      Chief complaint: Headache         Patient ID: Elizabeth Giordano is a 61 y.o. lady with carpal tunnel syndrome, low back pain secondary to lumbar spondylosis and DDD, lumbar radiculopathy followed by Dr. Abdalla, factor V leiden mutation, GERD and migraines here for follow up of migraines.     History of Present Illness    INTERVAL HISTORY 3/24/25  The patient location is: work  The chief complaint leading to consultation is: follow up  Visit type: audiovisual  20 minutes of total time spent on the encounter, which includes face to face time and non-face to face time preparing to see the patient (eg, review of tests), Obtaining and/or reviewing separately obtained history, Documenting clinical information in the electronic or other health record, Independently interpreting results (not separately reported) and communicating results to the patient/family/caregiver, or Care coordination (not separately reported).   Each patient to whom he or she provides medical services by telemedicine is:  (1) informed of the relationship between the physician and patient and the respective role of any other health care provider with respect to management of the patient; and (2) notified that he or she may decline to receive medical services by telemedicine and may withdraw from such care at any time.    Notes:     Last office visit was 13 months ago and at that time she was doing well.    Today she reports she is doing well. She did try to wean back on Emgality. She was taking Emgality every other month but now migraines more frequent again. She reports up to every other day headaches. When taking Emgality monthly she had maybe 2 headache days per month. She takes Ubrelvy as needed which is effective but takes awhile to work. Otherwise information below is reviewed and verified with no changes made    INTERVAL HISTORY 2/27/24    Last 
"visit was a little over a year ago and at that time she was doing well.    Today she reports she is doing well. She has tried to wean Emgality however she can only go 5 weeks before migraines return. Current pain 1 with range 0-9. She has less than two headache days per week. She takes Ubrelvy as needed. Otherwise information below is reviewed and verified with no changes made     INTERVAL HISTORY 11/10/22  The patient location is: home   The chief complaint leading to consultation is: follow up  Visit type: audiovisual  Face to Face time with patient: 10  15 minutes of total time spent on the encounter, which includes face to face time and non-face to face time preparing to see the patient (eg, review of tests), Obtaining and/or reviewing separately obtained history, Documenting clinical information in the electronic or other health record, Independently interpreting results (not separately reported) and communicating results to the patient/family/caregiver, or Care coordination (not separately reported).   Each patient to whom he or she provides medical services by telemedicine is:  (1) informed of the relationship between the physician and patient and the respective role of any other health care provider with respect to management of the patient; and (2) notified that he or she may decline to receive medical services by telemedicine and may withdraw from such care at any time.    Notes:     Last office visit was 13 months ago. At that time she was doing very well on Emgality.    Today she reports she is "still doing good". She did try to "wean" off Emgality. She would delay dose to 30 days and migraines would return. When taken every 28 days, she gets maybe two headaches per month. They are frontal. Current pain 0 with range 0-3. She treats early with Ubrelvy which is very effective. Otherwise information below is reviewed and verified with no changes made    INTERVAL HISTORY 9/16/21  The patient location is: "
"home  The chief complaint leading to consultation is: follow up  Visit type: audiovisual  Face to Face time with patient: 10  20 minutes of total time spent on the encounter, which includes face to face time and non-face to face time preparing to see the patient (eg, review of tests), Obtaining and/or reviewing separately obtained history, Documenting clinical information in the electronic or other health record, Independently interpreting results (not separately reported) and communicating results to the patient/family/caregiver, or Care coordination (not separately reported).   Each patient to whom he or she provides medical services by telemedicine is:  (1) informed of the relationship between the physician and patient and the respective role of any other health care provider with respect to management of the patient; and (2) notified that he or she may decline to receive medical services by telemedicine and may withdraw from such care at any time.    Notes:     Last visit was over a year ago and at that time she was doing well and we were still weaning off Botox. We added Ubrelvy for migraine abortive.     Today she reports she is still doing very well. She reports "maybe three" headache days per month and all are mild. She take Ubrelvy three times per month or less. She cannot recall the last time she had a full escalation to migraine. She has been on Emgality for a year as insurance would not cover Botox.    INTERVAL HISTORY 2/11/2020    At last office visit, patient was doing very well on Botox and interested in weaning off. Last Botox session was almost 15 weeks ago. Abortive medications worked well. She now has new insurance and Botox no longer possible due to pharmacy/distribution issues.     Today she reports she is about the same. When headaches present, they are in the front right, behind eye. Current pain 0 with range 0-10. She takes excedrin or amerge three times per month. Three migraines per month. "
"    INTERVAL HISTORY 12/2/19    Patient presents for 6-week follow up of her 7th Botox session. She previously reported 90% relief of headaches, no wean off effect, and was interested in weaning off of Botox. She reports she is overall much better. Only one small migraine and it was "knocked out" with medications (amerge). Headaches remain right side of head and right eye. current pain 0 with range 0-10.     I/NTERVAL HISTORY - 7/17/18     She is status post first botox session 5/24/18. Today she reports she is a little better. She had no migraines for the first 1.5 weeks and now has returned to 3 headache days per week. Her current headaches are right side of head and eye. Her current pain is 0 with a range of 0 to 10. She remains on Effexor and naratriptan. The effexor still has not been helpful.     INTERVAL HISTORY - 5/8/18     At last visit we covered topiramate, which was working to reduce headaches by 50%, to zonisamide due to significant memory problems.The memory problems DID improve. The zonisamide caused itchiness so 6 weeks ago we converted to Effexor. She is now having MORE headaches - 3-4 per week. The as needed medications are working well.     INTERVAL HISTORY - 1/30/18     Last visit 3 months ago we planned on continuing topiramate titration and sumatriptan for acute.    Today she reports the current regimen seems to be working - her regular baseline headache went down from 12-15 per month to about half that, and had one long flare since last seen - used naratriptan once came back, used sumatriptan once, came back . Her current pain is 0, with a range of 0 to 10. The pain is unchanged in location.    She has been having significant memory problems since being on topiramate.     INTERVAL HISTORY - 10/25/17     Last seen 2 months ago at which point I raised topiramate and started naratriptan bridges for long migraines. Today she reports she is about the same. However, last week she had a good week and "
16-Oct-2023 12:00
16-Oct-2023 20:00
16-Oct-2023 09:50
16-Oct-2023 15:59
had no headaches.The headaches have the same characteristics are before. Her current pain score is 5. Her range is 0-10. She can not predict when she will have severe migraines so naratriptan bridge was taken at terminal end (day 4 of flare), not at the start, and did not have the intended effect. She has tingling in all toes (topamax) but worse on right and gets injections in her foot for this.    HEADACHE HISTORY - 8/9/17   Age at onset and course over time: migraines began in her 30s, she reports going through menopause at age 37 (unclear why) and she thinks this may have started it. One day just became more sensitive to sensory stimuli especially smells. Had severe migraines at least one time per week. Started Celexa for this purpose which helped her sensitivity to smells. Overall thinks her headache number has stayed the same over the years but pain has become more severe and she is yet again more sensitive to smells, thinks medications aren't working as well.   Location: right side behind eye (baseline headache and migraine)   Quality: throbbing   Severity: current 2/10, at worst 10/10   Duration: exacerbations last 5-6 days   Frequency: baseline headache about 12-15 days per month; every other month will have a flare lasting 5-6 days   Alleviated by: sleep, ice, medication   Exacerbated by: light, noise, smells, coughing   Associated with: photo/noise, phobia, osmophobia. No aura. + right eye tearing. No ptosis, no redness. No eye irritation. No ear fullness.   Sleep habits:  Caffeine intake: 1     She is on daily HRT for menopause.     Current acute treatment - migraines treated 4 times per month    -- tramadol 50mg BID  -- zofran rare use  Ubrelvy - effective     Current prevention:  Emgality - started August 2020  Lyrica   (losarta)  (amlodipine)  Lexapro    Previously tried/failed acute treatment:  -- hydrocodone: allergy  -- oxycodone: allergy   -- zomig   -- relpax   -- naproxen  -- tramadol   -- 
16-Oct-2023 19:04
fiorinal with codeine   -- Goody's   -- Excedrin   -- tylenol  -- ASA  - tizanidine 2mg nightly - for migraines, daily   -- sumatriptan 100mg at onset of headache, gets recurrent headache next day, (zomig works better but not covered by her insurance)  -- amerge - works OK but sumatriptan is cheaper   -- meloxicam 15mg for  Arthritis in right thumb daily    Previously tried/failed preventative treatment:  -- amitriptyline (for back pain, but migraines were no better)  -- gabapentin - for back pain, didn't help   -- lyrica - memory problems   -- topiramate 100mg BID Tingling in toes - helping with migraines but having significant memory problems   -- zonisamide - itchy   (celexa 40mg)   -- Effexor XR 150mg in the morning - started 2/27/18 - headaches are worse  -- Botox 7 sessions -- worked very well, migraines in near remission   (gabapentin)    Hx multiple lumbar ESIs and LMB RFAs for low back pain/radiculopathy       Review of patient's allergies indicates:   Allergen Reactions    Hydrocodone Hives and Nausea Only           Sulfa (sulfonamide antibiotics) Hives and Rash           Oxycodone Itching and Nausea And Vomiting     Current Outpatient Medications   Medication Sig Dispense Refill    ALPRAZolam (XANAX) 0.25 MG tablet Take 1 tablet (0.25 mg total) by mouth 3 (three) times daily as needed for Anxiety. 60 tablet 1    amLODIPine (NORVASC) 5 MG tablet TAKE 1 TABLET(5 MG) BY MOUTH EVERY DAY 90 tablet 3    ascorbic acid, vitamin C, (VITAMIN C) 500 MG tablet Take 1 tablet (500 mg total) by mouth 2 (two) times daily.      aspirin (ECOTRIN) 81 MG EC tablet Take 81 mg by mouth once daily.      atorvastatin (LIPITOR) 10 MG tablet TAKE 1 TABLET(10 MG) BY MOUTH EVERY DAY 90 tablet 3    azelastine (ASTELIN) 137 mcg (0.1 %) nasal spray 2 sprays (274 mcg total) by Nasal route 2 (two) times daily. 30 mL 2    benzonatate (TESSALON) 100 MG capsule Take 1 capsule (100 mg total) by mouth 3 (three) times daily as needed for 
Cough. 45 capsule 1    brimonidine 0.2% (ALPHAGAN) 0.2 % Drop Place 1 drop into both eyes as needed (night time driving.). 10 mL 3    cimetidine (TAGAMET) 800 MG tablet TAKE 1 TABLET BY MOUTH EVERY EVENING 90 tablet 2    EScitalopram oxalate (LEXAPRO) 10 MG tablet TAKE 1 TABLET(10 MG) BY MOUTH DAILY 90 tablet 3    estradioL (ESTRACE) 1 MG tablet Take 1 tablet (1 mg total) by mouth once daily. Can cause blood clots with covid, recommend NOT taking this medication until covid symptoms completely gone and discussing with PCP  3    eszopiclone (LUNESTA) 3 mg Tab Take 1 tablet (3 mg total) by mouth every evening. 90 tablet 0    fluticasone propionate (FLONASE) 50 mcg/actuation nasal spray 1 spray (50 mcg total) by Each Nostril route Daily. 16 g 3    galcanezumab-gnlm (EMGALITY PEN) 120 mg/mL PnIj Inject 1 pen (120 mg total) into the skin every 28 days. 1 mL 11    ibuprofen (ADVIL,MOTRIN) 800 MG tablet TAKE 1 TABLET(800 MG) BY MOUTH EVERY 6 HOURS AS NEEDED FOR PAIN 30 tablet 0    ipratropium (ATROVENT) 42 mcg (0.06 %) nasal spray 2 sprays by Each Nostril route 3 (three) times daily. 15 mL 2    LIDOcaine (LIDODERM) 5 % PLACE 1 PATCH ONTO THE AFFECTED AREA OF SKIN DAILY AND REMOVE WITHIN 12 HOURS 30 patch 5    losartan (COZAAR) 100 MG tablet TAKE 1 TABLET(100 MG) BY MOUTH EVERY DAY 90 tablet 3    nystatin (MYCOSTATIN) cream Applied t.i.d.      ondansetron (ZOFRAN-ODT) 4 MG TbDL Take 1 tablet (4 mg total) by mouth every 6 (six) hours as needed (nausea). 20 tablet 1    pantoprazole (PROTONIX) 40 MG tablet TAKE 1 TABLET(40 MG) BY MOUTH EVERY DAY 90 tablet 3    pregabalin (LYRICA) 50 MG capsule Take 1 capsule (50 mg total) by mouth 3 (three) times daily. 90 capsule 02    traMADoL (ULTRAM) 50 mg tablet TAKE 1 TABLET BY MOUTH EVERY 12 HOURS AS NEEDED FOR PAIN 60 tablet 02    ubrogepant (UBRELVY) 50 mg tablet Take 1 tablet by mouth at onset of migraine. May repeat in 2 hours if needed. Max 2 tablets per day. 10 tablet 11    
vitamin D (VITAMIN D3) 1000 units Tab Take 1 tablet (1,000 Units total) by mouth once daily.      zinc sulfate (ZINCATE) 220 (50) mg capsule Take 1 capsule (220 mg total) by mouth once daily.       No current facility-administered medications for this visit.       Past Medical History  Past Medical History:   Diagnosis Date    Allergy     Arthritis of spine 2011    Cataract     Chronic low back pain     Class 2 obesity with body mass index (BMI) of 39.0 to 39.9 in adult 01/11/2019    Coronary artery disease     mild    DDD (degenerative disc disease), lumbar     Diverticulitis     Diverticulosis     DJD (degenerative joint disease)     Encounter for blood transfusion     Factor V Leiden     GERD (gastroesophageal reflux disease)     Hiatal hernia     Hypertension     Migraines     PONV (postoperative nausea and vomiting)     geta    Schatzki's ring     Urticaria        Past Surgical History  Past Surgical History:   Procedure Laterality Date    APPENDECTOMY  1981    CARPAL TUNNEL RELEASE  2011    right    CATARACT EXTRACTION W/  INTRAOCULAR LENS IMPLANT Right 5/29/2024    Procedure: EXTRACTION, CATARACT, WITH IOL INSERTION;  Surgeon: Adelia Dalton MD;  Location: [facility] OR;  Service: Ophthalmology;  Laterality: Right;    CATARACT EXTRACTION W/  INTRAOCULAR LENS IMPLANT Left 8/7/2024    Procedure: EXTRACTION, CATARACT, WITH IOL INSERTION;  Surgeon: Adelia Dalton MD;  Location: [facility] OR;  Service: Ophthalmology;  Laterality: Left;    COLONOSCOPY  2014    Dr. Palomares; reduntant colon, otherwise normal findings repeat in 8-10 years for surveillance    Epidural Steroid Injection      Pain Management    EPIDURAL STEROID INJECTION INTO CERVICAL SPINE N/A 09/25/2018    Procedure: Injection-steroid-epidural-cervical;  Surgeon: Oswald Abdalla MD;  Location: [facility] OR;  Service: Pain Management;  Laterality: N/A;    EPIDURAL STEROID INJECTION INTO CERVICAL SPINE N/A 10/24/2019    Procedure: 
Injection-steroid-epidural-cervical;  Surgeon: Oswald Abdalla MD;  Location: [facility] OR;  Service: Pain Management;  Laterality: N/A;    EPIDURAL STEROID INJECTION INTO CERVICAL SPINE N/A 02/22/2023    Procedure: Injection-steroid-epidural-cervical C7-T1;  Surgeon: Oswald Abdalla MD;  Location: [facility] OR;  Service: Pain Management;  Laterality: N/A;    EPIDURAL STEROID INJECTION INTO CERVICAL SPINE N/A 10/30/2024    Procedure: Injection-steroid-epidural-cervical C7/T1;  Surgeon: Oswald Abdalla MD;  Location: [facility] OR;  Service: Pain Management;  Laterality: N/A;  C7/T1    EPIDURAL STEROID INJECTION INTO LUMBAR SPINE N/A 12/27/2018    Procedure: Injection-steroid-epidural-lumbar L5/S1;  Surgeon: Oswald Abdalla MD;  Location: [facility] OR;  Service: Pain Management;  Laterality: N/A;    EPIDURAL STEROID INJECTION INTO LUMBAR SPINE N/A 09/04/2019    Procedure: Injection-steroid-epidural-lumbar;  Surgeon: Oswald Abdalla MD;  Location: [facility] OR;  Service: Pain Management;  Laterality: N/A;  L5/S1 to right    EPIDURAL STEROID INJECTION INTO LUMBAR SPINE N/A 06/16/2023    Procedure: Injection-steroid-epidural-lumbar L5/S1;  Surgeon: Oswald Abdalla MD;  Location: [facility] OR;  Service: Pain Management;  Laterality: N/A;    ESOPHAGOGASTRODUODENOSCOPY  05/17/2016    Dr. Arreguin; small hiatal hernia; gastritis    ESOPHAGOGASTRODUODENOSCOPY N/A 06/22/2020    Dr. Arreguin; mild Schatzki ring- dilated; small hiatal hernia; bx unremarkable    Facet injection      Pain Management    HYSTERECTOMY      ovaries removed    KIDNEY SURGERY Right 1967    to correct urinary reflux into kidney    LEFT HEART CATHETERIZATION Left 05/22/2019    Procedure: Left heart cath;  Surgeon: Casa Perdue MD;  Location: [facility] CATH;  Service: Cardiology;  Laterality: Left;    OVARIAN CYST REMOVAL Right 1981    RADIOFREQUENCY ABLATION OF LUMBAR MEDIAL BRANCH NERVE AT SINGLE LEVEL Bilateral 07/17/2018    Procedure: RADIOFREQUENCY 
ABLATION, NERVE, MEDIAL BRANCH, LUMBAR, L2,3,4;  Surgeon: Oswald Abdalla MD;  Location: [facility] OR;  Service: Pain Management;  Laterality: Bilateral;    RADIOFREQUENCY ABLATION OF LUMBAR MEDIAL BRANCH NERVE AT SINGLE LEVEL Bilateral 07/29/2019    Procedure: Radiofrequency Ablation, Nerve, Spinal, Lumbar, Medial Branch, L2,3,4;  Surgeon: Oswald Abdalla MD;  Location: [facility] OR;  Service: Pain Management;  Laterality: Bilateral;    SHOULDER SURGERY  2010    rotator cuff, right    TRANSFORAMINAL EPIDURAL INJECTION OF STEROID Left 08/21/2023    Procedure: Injection,steroid,epidural,transforaminal approach L4/5 L5/S1 Left;  Surgeon: Oswald Abdalla MD;  Location: [facility] OR;  Service: Pain Management;  Laterality: Left;    TRANSFORAMINAL EPIDURAL INJECTION OF STEROID Right 3/4/2024    Procedure: Injection,steroid,epidural,transforaminal approach right L4/5 and L5/S1;  Surgeon: Oswald Abdalla MD;  Location: [facility] OR;  Service: Pain Management;  Laterality: Right;  right L4/5 and L5/S1       Family History  Family History   Problem Relation Name Age of Onset    Cataracts Mother      Heart attack Mother      Heart disease Mother      COPD Mother      Hypertension Mother      COPD Father      Hepatitis Sister      Breast cancer Maternal Grandmother      No Known Problems Daughter      Allergic rhinitis Neg Hx      Angioedema Neg Hx      Asthma Neg Hx      Atopy Neg Hx      Eczema Neg Hx      Immunodeficiency Neg Hx      Urticaria Neg Hx      Colon cancer Neg Hx      Colon polyps Neg Hx      Glaucoma Neg Hx      Macular degeneration Neg Hx      Retinal detachment Neg Hx         Social History  Social History     Socioeconomic History    Marital status:     Number of children: 1   Occupational History     Employer: [employer]   Tobacco Use    Smoking status: Former     Current packs/day: 0.00     Average packs/day: 1 pack/day for 5.0 years (5.0 ttl pk-yrs)     Types: Cigarettes     Start date: 
1992     Quit date: 1997     Years since quittin.2     Passive exposure: Never    Smokeless tobacco: Never   Substance and Sexual Activity    Alcohol use: Yes     Comment: 3x per year    Drug use: No    Sexual activity: Yes     Partners: Male   Social History Narrative    ** Merged History Encounter **          Social Drivers of Health     Financial Resource Strain: Low Risk  (2024)    Overall Financial Resource Strain (CARDIA)     Difficulty of Paying Living Expenses: Not hard at all   Food Insecurity: No Food Insecurity (2024)    Hunger Vital Sign     Worried About Running Out of Food in the Last Year: Never true     Ran Out of Food in the Last Year: Never true   Transportation Needs: No Transportation Needs (2024)    PRAPARE - Transportation     Lack of Transportation (Medical): No     Lack of Transportation (Non-Medical): No   Physical Activity: Sufficiently Active (2024)    Exercise Vital Sign     Days of Exercise per Week: 5 days     Minutes of Exercise per Session: 60 min   Stress: No Stress Concern Present (2024)    [place] [employer] - Occupational Stress Questionnaire     Feeling of Stress : Only a little   Housing Stability: Low Risk  (2024)    Housing Stability Vital Sign     Unable to Pay for Housing in the Last Year: No     Number of Places Lived in the Last Year: 1     Unstable Housing in the Last Year: No         Objective:        There were no vitals filed for this visit.    There is no height or weight on file to calculate BMI.    3/24/25  Constitutional:   She appears well-developed and well-nourished. She is well groomed     Neurological Exam:  General: well-developed, well-nourished, no distress  Mental status: Awake and alert  Speech language: No dysarthria or aphasia on conversation  Cranial nerves: Face symmetric      Data Review:     No results found for this or any previous visit.    Lab Results   Component Value Date    NA 
141 11/25/2024    K 4.1 11/25/2024    MG 1.8 08/08/2024     11/25/2024    CO2 23 11/25/2024    BUN 14 11/25/2024    CREATININE 1.1 11/25/2024    GLU 88 11/25/2024    HGBA1C 5.3 11/25/2024    AST 22 11/25/2024    ALT 13 11/25/2024    ALBUMIN 3.9 11/25/2024    PROT 6.4 11/25/2024    BILITOT 0.5 11/25/2024    CHOL 149 11/25/2024    HDL 67 11/25/2024    LDLCALC 69.8 11/25/2024    TRIG 61 11/25/2024       Lab Results   Component Value Date    WBC 3.82 (L) 11/25/2024    HGB 12.1 11/25/2024    HCT 35.2 (L) 11/25/2024    MCV 96 11/25/2024     11/25/2024       Lab Results   Component Value Date    TSH 0.680 11/25/2024       Assessment & Plan:       Problem List Items Addressed This Visit          Neuro    Intractable chronic migraine without aura    Overview   Migraines of many years, triggered by menopause. Normal neuro exam. Excellent response to full dose topiramate but not tolerating due to memory impairment. Converted over to zonisamide to improve tolerance but developed rash to this. Therefore, converted citalpram to Effexor but as of 6 weeks on full dose headaches are only worse. Will give Effexor another 4 week trial and prepare to begin Botox.     The patient has chronic migraines (G43.719) and suffers from headaches more than 15 days a month lasting more than 4 hours a day with no relief of symptoms despite trying multiple medications including but not limited to anti-epileptics (topiramate, gabapentin, lyrica, zonisamide), and antidepressants (amitriptyline, venflaxine). Botox treatment was approved for chronic migraines in October 2010. The patient will be an ideal candidate for Botox. We are planning for 3 treatments 3 months apart and aiming for at least 50% improvement in the symptoms. If we see no improvement after 3 treatments, we will discontinue the injections.    She has been on Emgality since 2020 with excellent results. She tried to wean off Emgality but migraines immediately returned. She 
treats early with Ubrelvy which is very effective.          Current Assessment & Plan   Again tried to wean off Emgality but migraines returned. Continue current plan.          Relevant Medications    galcanezumab-gnlm (EMGALITY PEN) 120 mg/mL PnIj                   WORKUP:  -- none     PREVENTION (use daily regardless of headache):  -- continue Emgality - return to monthly    ACUTE TREATMENT:  -- continue Ubrelvy       Follow up in about 3 months (around 6/24/2025).      Linda Melgar, NP